# Patient Record
Sex: FEMALE | Race: WHITE | HISPANIC OR LATINO | Employment: UNEMPLOYED | URBAN - METROPOLITAN AREA
[De-identification: names, ages, dates, MRNs, and addresses within clinical notes are randomized per-mention and may not be internally consistent; named-entity substitution may affect disease eponyms.]

---
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
## 2020-07-06 ENCOUNTER — HOSPITAL ENCOUNTER (EMERGENCY)
Facility: HOSPITAL | Age: 20
Discharge: HOME | End: 2020-07-06
Attending: EMERGENCY MEDICINE | Admitting: EMERGENCY MEDICINE
Payer: COMMERCIAL

## 2020-07-06 VITALS
SYSTOLIC BLOOD PRESSURE: 118 MMHG | TEMPERATURE: 97.6 F | DIASTOLIC BLOOD PRESSURE: 77 MMHG | HEART RATE: 87 BPM | RESPIRATION RATE: 18 BRPM | OXYGEN SATURATION: 100 %

## 2020-07-06 DIAGNOSIS — S30.814A VAGINAL ABRASION, INITIAL ENCOUNTER: Primary | ICD-10-CM

## 2020-07-06 LAB
BACTERIA UR QL AUTO: ABNORMAL /HPF
BILIRUB UR QL STRIP: NEGATIVE
CLARITY UR: CLEAR
COLOR UR: ABNORMAL
EXT PREG TEST URINE: NEGATIVE
EXT. CONTROL ED NAV: NORMAL
GLUCOSE UR STRIP-MCNC: NEGATIVE MG/DL
HGB UR QL STRIP.AUTO: ABNORMAL
KETONES UR STRIP-MCNC: NEGATIVE MG/DL
LEUKOCYTE ESTERASE UR QL STRIP: NEGATIVE
NITRITE UR QL STRIP: NEGATIVE
NON-SQ EPI CELLS URNS QL MICRO: ABNORMAL /HPF
PH UR STRIP.AUTO: 7 [PH]
PROT UR STRIP-MCNC: NEGATIVE MG/DL
RBC #/AREA URNS AUTO: ABNORMAL /HPF
SP GR UR STRIP.AUTO: 1.01 (ref 1–1.03)
UROBILINOGEN UR QL STRIP.AUTO: 0.2 E.U./DL
WBC #/AREA URNS AUTO: ABNORMAL /HPF

## 2020-07-06 PROCEDURE — 99283 EMERGENCY DEPT VISIT LOW MDM: CPT

## 2020-07-06 PROCEDURE — 81025 URINE PREGNANCY TEST: CPT | Performed by: PHYSICIAN ASSISTANT

## 2020-07-06 PROCEDURE — 99282 EMERGENCY DEPT VISIT SF MDM: CPT | Performed by: PHYSICIAN ASSISTANT

## 2020-07-06 PROCEDURE — 81001 URINALYSIS AUTO W/SCOPE: CPT | Performed by: PHYSICIAN ASSISTANT

## 2020-07-06 PROCEDURE — 81003 URINALYSIS AUTO W/O SCOPE: CPT | Performed by: PHYSICIAN ASSISTANT

## 2020-07-06 NOTE — ED NOTES
Patient c/o vaginal tears that are painful, occur s/p sexual intercourse  Patient reports "no matter how slow or how much lubrication " Patient states she is hesitant to try different lubrications due to allergies        Aurora Myers RN  07/06/20 1532

## 2020-07-06 NOTE — DISCHARGE INSTRUCTIONS
Use Tylenol 650mg every 4 hours or Motrin 600mg every 6 hours; you can alternate the 2 medications taking something every 3 hours for pain or fever  If no improvement follow-up with your doctor in next few days  You can also apply over-the-counter antibiotic ointment PLUS pain to help with symptoms to outer skin areas

## 2020-07-06 NOTE — ED NOTES
JON Antoine aware of NEG urine preg  Urine sent to lab as per order  Patient aware pending results        Ricki Valdez RN  07/06/20 9371

## 2020-07-06 NOTE — ED PROVIDER NOTES
History  Chief Complaint   Patient presents with    Vaginal Pain     Patient reports "I have a couple tears down there " Vaginal pain with tears, patient reports happens with sexual intercourse  Denies dysuria  Patient reports she has not seen her OBGYN because they are too far away and it is the only provider in her network  Denies chance of pregnancy  "I literally just got my period this morning":     PT with Past Medical History Anxiety, Asthma, Borderline personality disorder, Ovarian cyst, PTSD, Schizophrenia, presents to emergency department complaining of few day history of tears to vaginal area after intercourse with some dysuria only when urine hits outside of her vagina, and starting her period today  Patient denies fever, no nausea, no vomiting, no other urinary symptoms, no bowel changes            Prior to Admission Medications   Prescriptions Last Dose Informant Patient Reported? Taking? PRAZOSIN HCL PO   Yes No   Sig: Take by mouth as needed   albuterol (PROVENTIL HFA,VENTOLIN HFA) 90 mcg/act inhaler   Yes No   Sig: Inhale 2 puffs every 6 (six) hours as needed for wheezing   loratadine (CLARITIN) 10 mg tablet   Yes No   Sig: Take 10 mg by mouth daily      Facility-Administered Medications: None       Past Medical History:   Diagnosis Date    Anxiety     Asthma     Borderline personality disorder ([facility] )     Eating disorder     Ovarian cyst     PTSD (post-traumatic stress disorder)     Schizophrenia ([facility] )        No past surgical history on file  Family History   Problem Relation Age of Onset    Asthma Mother     OCD Mother     Autism Son      I have reviewed and agree with the history as documented  E-Cigarette/Vaping     E-Cigarette/Vaping Substances     Social History     Tobacco Use    Smoking status: Never Smoker   Substance Use Topics    Alcohol use: No    Drug use: No       Review of Systems   Constitutional: Negative for fever     HENT: Negative for hearing loss and sore throat  Eyes: Negative for visual disturbance  Respiratory: Negative for cough and shortness of breath  Cardiovascular: Negative for chest pain and leg swelling  Gastrointestinal: Negative for abdominal pain and vomiting  Genitourinary: Positive for dyspareunia, dysuria, genital sores and vaginal bleeding (States she just started her menses today)  Negative for frequency, menstrual problem and pelvic pain  Musculoskeletal: Negative for arthralgias and myalgias  Skin: Negative for color change and pallor  Neurological: Negative for dizziness and weakness  Psychiatric/Behavioral: Negative for behavioral problems  Physical Exam  Physical Exam   Constitutional: She is oriented to person, place, and time  She appears well-developed and well-nourished  HENT:   Head: Normocephalic and atraumatic  Eyes: Conjunctivae are normal    Neck: Normal range of motion  Cardiovascular: Normal rate and regular rhythm  Pulmonary/Chest: Effort normal and breath sounds normal    Abdominal: Soft  Bowel sounds are normal    Genitourinary:         Genitourinary Comments: Patient with mild amount of normal dark red vaginal bleeding from menses, no other dc, no yeast seen  Two skin tears noted to lower, outer vaginal canal/intertrigo area, no bleeding, mild tenderness ,no other lesions noted   Musculoskeletal: Normal range of motion  Neurological: She is alert and oriented to person, place, and time  Skin: Skin is warm and dry  Psychiatric: She has a normal mood and affect  Her behavior is normal    Nursing note and vitals reviewed        Vital Signs  ED Triage Vitals [07/06/20 1112]   Temperature Pulse Respirations Blood Pressure SpO2   97 6 °F (36 4 °C) 87 18 118/77 100 %      Temp Source Heart Rate Source Patient Position - Orthostatic VS BP Location FiO2 (%)   Tympanic -- -- Left arm --      Pain Score       --           Vitals:    07/06/20 1112   BP: 118/77   Pulse: 87         Visual Acuity      ED Medications  Medications - No data to display    Diagnostic Studies  Results Reviewed     None                 No orders to display              Procedures  Procedures         ED Course                                             MDM  Number of Diagnoses or Management Options  Diagnosis management comments: Urine is normal no signs of other vaginal discharge, patient comfortable discussed with patient care for vaginal skin tears has good follow-up        Disposition  Final diagnoses:   Vaginal abrasion, initial encounter     Time reflects when diagnosis was documented in both MDM as applicable and the Disposition within this note     Time User Action Codes Description Comment    7/6/2020  1:08 PM Chriss Goodman Vaginal abrasion, initial encounter       ED Disposition     ED Disposition Condition Date/Time Comment    Discharge Stable Mon Jul 6, 2020  1:08 PM Soila Nolen discharge to home/self care  Follow-up Information     Follow up With Specialties Details Why Contact Info    Your GYN doctor   As needed           Patient's Medications   Discharge Prescriptions    No medications on file     No discharge procedures on file      PDMP Review     None          ED Provider  Electronically Signed by           Valarie Yeh PA-C  07/06/20 6056

## 2020-11-27 ENCOUNTER — HOSPITAL ENCOUNTER (EMERGENCY)
Facility: HOSPITAL | Age: 20
Discharge: HOME | End: 2020-11-27
Attending: EMERGENCY MEDICINE
Payer: COMMERCIAL

## 2020-11-27 VITALS
TEMPERATURE: 98.2 F | HEIGHT: 60 IN | OXYGEN SATURATION: 100 % | BODY MASS INDEX: 27.88 KG/M2 | RESPIRATION RATE: 19 BRPM | DIASTOLIC BLOOD PRESSURE: 69 MMHG | WEIGHT: 142 LBS | HEART RATE: 109 BPM | SYSTOLIC BLOOD PRESSURE: 117 MMHG

## 2020-11-27 DIAGNOSIS — T50.901A ACCIDENTAL OVERDOSE: Primary | ICD-10-CM

## 2020-11-27 LAB
ALBUMIN SERPL BCP-MCNC: 5 G/DL (ref 3.4–4.8)
ALP SERPL-CCNC: 93.4 U/L (ref 35–140)
ALT SERPL W P-5'-P-CCNC: 9 U/L (ref 5–54)
AMMONIA PLAS-SCNC: 59.5 UMOL/L
AMPHETAMINES SERPL QL SCN: NEGATIVE
ANION GAP SERPL CALCULATED.3IONS-SCNC: 6 MMOL/L (ref 4–13)
APAP SERPL-MCNC: <10 UG/ML (ref 10–20)
APTT PPP: 31 SECONDS (ref 23–31)
AST SERPL W P-5'-P-CCNC: 18 U/L (ref 15–41)
BARBITURATES UR QL: NEGATIVE
BASOPHILS # BLD AUTO: 0.02 THOUSANDS/ΜL (ref 0–0.1)
BASOPHILS NFR BLD AUTO: 0 % (ref 0–1)
BENZODIAZ UR QL: NEGATIVE
BILIRUB DIRECT SERPL-MCNC: 0.2 MG/DL (ref 0–0.3)
BILIRUB SERPL-MCNC: 1.06 MG/DL (ref 0.3–1.2)
BILIRUB UR QL STRIP: NEGATIVE
BUN SERPL-MCNC: 9 MG/DL (ref 6–20)
CALCIUM SERPL-MCNC: 9.9 MG/DL (ref 8.4–10.2)
CHLORIDE SERPL-SCNC: 100 MMOL/L (ref 96–108)
CLARITY UR: ABNORMAL
CO2 SERPL-SCNC: 27 MMOL/L (ref 22–33)
COCAINE UR QL: NEGATIVE
COLOR UR: YELLOW
CREAT SERPL-MCNC: 0.75 MG/DL (ref 0.4–1.1)
EOSINOPHIL # BLD AUTO: 0.1 THOUSAND/ΜL (ref 0–0.61)
EOSINOPHIL NFR BLD AUTO: 1 % (ref 0–6)
ERYTHROCYTE [DISTWIDTH] IN BLOOD BY AUTOMATED COUNT: 12.4 % (ref 11.6–15.1)
EXT PREG TEST URINE: NEGATIVE
EXT. CONTROL ED NAV: NORMAL
GFR SERPL CREATININE-BSD FRML MDRD: 115 ML/MIN/1.73SQ M
GLUCOSE SERPL-MCNC: 99 MG/DL (ref 65–140)
GLUCOSE UR STRIP-MCNC: NEGATIVE MG/DL
HCT VFR BLD AUTO: 38.2 % (ref 34.8–46.1)
HGB BLD-MCNC: 13 G/DL (ref 11.5–15.4)
HGB UR QL STRIP.AUTO: NEGATIVE
IMM GRANULOCYTES # BLD AUTO: 0.02 THOUSAND/UL (ref 0–0.2)
IMM GRANULOCYTES NFR BLD AUTO: 0 % (ref 0–2)
INR PPP: 1.04 (ref 0.9–1.1)
KETONES UR STRIP-MCNC: ABNORMAL MG/DL
LEUKOCYTE ESTERASE UR QL STRIP: NEGATIVE
LIPASE SERPL-CCNC: 9 U/L (ref 13–60)
LYMPHOCYTES # BLD AUTO: 2.22 THOUSANDS/ΜL (ref 0.6–4.47)
LYMPHOCYTES NFR BLD AUTO: 27 % (ref 14–44)
MAGNESIUM SERPL-MCNC: 2 MG/DL (ref 1.6–2.6)
MCH RBC QN AUTO: 30.7 PG (ref 26.8–34.3)
MCHC RBC AUTO-ENTMCNC: 34 G/DL (ref 31.4–37.4)
MCV RBC AUTO: 90 FL (ref 82–98)
METHADONE UR QL: NEGATIVE
MONOCYTES # BLD AUTO: 0.74 THOUSAND/ΜL (ref 0.17–1.22)
MONOCYTES NFR BLD AUTO: 9 % (ref 4–12)
NEUTROPHILS # BLD AUTO: 5.15 THOUSANDS/ΜL (ref 1.85–7.62)
NEUTS SEG NFR BLD AUTO: 63 % (ref 43–75)
NITRITE UR QL STRIP: NEGATIVE
OPIATES UR QL SCN: NEGATIVE
OXYCODONE+OXYMORPHONE UR QL SCN: NEGATIVE
PCP UR QL: NEGATIVE
PH UR STRIP.AUTO: 6 [PH]
PLATELET # BLD AUTO: 298 THOUSANDS/UL (ref 149–390)
PMV BLD AUTO: 10.4 FL (ref 8.9–12.7)
POTASSIUM SERPL-SCNC: 3.8 MMOL/L (ref 3.5–5)
PROT SERPL-MCNC: 8.3 G/DL (ref 6.4–8.3)
PROT UR STRIP-MCNC: NEGATIVE MG/DL
PROTHROMBIN TIME: 11.7 SECONDS (ref 9.5–12.1)
RBC # BLD AUTO: 4.23 MILLION/UL (ref 3.81–5.12)
SALICYLATES SERPL-MCNC: <2.5 MG/DL (ref 6–30)
SODIUM SERPL-SCNC: 133 MMOL/L (ref 133–145)
SP GR UR STRIP.AUTO: 1.01 (ref 1–1.03)
THC UR QL: POSITIVE
TROPONIN I SERPL-MCNC: <0.03 NG/ML (ref 0–0.07)
UROBILINOGEN UR QL STRIP.AUTO: 0.2 E.U./DL
WBC # BLD AUTO: 8.25 THOUSAND/UL (ref 4.31–10.16)

## 2020-11-27 PROCEDURE — 82140 ASSAY OF AMMONIA: CPT | Performed by: EMERGENCY MEDICINE

## 2020-11-27 PROCEDURE — 99285 EMERGENCY DEPT VISIT HI MDM: CPT

## 2020-11-27 PROCEDURE — 36415 COLL VENOUS BLD VENIPUNCTURE: CPT | Performed by: EMERGENCY MEDICINE

## 2020-11-27 PROCEDURE — 93005 ELECTROCARDIOGRAM TRACING: CPT

## 2020-11-27 PROCEDURE — 83735 ASSAY OF MAGNESIUM: CPT | Performed by: EMERGENCY MEDICINE

## 2020-11-27 PROCEDURE — 83690 ASSAY OF LIPASE: CPT | Performed by: EMERGENCY MEDICINE

## 2020-11-27 PROCEDURE — 99448 NTRPROF PH1/NTRNET/EHR 21-30: CPT | Performed by: EMERGENCY MEDICINE

## 2020-11-27 PROCEDURE — 81003 URINALYSIS AUTO W/O SCOPE: CPT | Performed by: EMERGENCY MEDICINE

## 2020-11-27 PROCEDURE — 99285 EMERGENCY DEPT VISIT HI MDM: CPT | Performed by: EMERGENCY MEDICINE

## 2020-11-27 PROCEDURE — 81025 URINE PREGNANCY TEST: CPT | Performed by: EMERGENCY MEDICINE

## 2020-11-27 PROCEDURE — 85610 PROTHROMBIN TIME: CPT | Performed by: EMERGENCY MEDICINE

## 2020-11-27 PROCEDURE — 80048 BASIC METABOLIC PNL TOTAL CA: CPT | Performed by: EMERGENCY MEDICINE

## 2020-11-27 PROCEDURE — 85730 THROMBOPLASTIN TIME PARTIAL: CPT | Performed by: EMERGENCY MEDICINE

## 2020-11-27 PROCEDURE — 80076 HEPATIC FUNCTION PANEL: CPT | Performed by: EMERGENCY MEDICINE

## 2020-11-27 PROCEDURE — 85025 COMPLETE CBC W/AUTO DIFF WBC: CPT | Performed by: EMERGENCY MEDICINE

## 2020-11-27 PROCEDURE — 87591 N.GONORRHOEAE DNA AMP PROB: CPT | Performed by: EMERGENCY MEDICINE

## 2020-11-27 PROCEDURE — 84484 ASSAY OF TROPONIN QUANT: CPT | Performed by: EMERGENCY MEDICINE

## 2020-11-27 PROCEDURE — 80329 ANALGESICS NON-OPIOID 1 OR 2: CPT | Performed by: EMERGENCY MEDICINE

## 2020-11-27 PROCEDURE — 96360 HYDRATION IV INFUSION INIT: CPT

## 2020-11-27 PROCEDURE — 80307 DRUG TEST PRSMV CHEM ANLYZR: CPT | Performed by: EMERGENCY MEDICINE

## 2020-11-27 PROCEDURE — 87491 CHLMYD TRACH DNA AMP PROBE: CPT | Performed by: EMERGENCY MEDICINE

## 2020-11-27 RX ORDER — BUPROPION HYDROCHLORIDE 75 MG/1
150 TABLET ORAL DAILY
COMMUNITY

## 2020-11-27 RX ADMIN — SODIUM CHLORIDE 1000 ML: 0.9 INJECTION, SOLUTION INTRAVENOUS at 15:02

## 2020-11-29 LAB
ATRIAL RATE: 63 BPM
C TRACH DNA SPEC QL NAA+PROBE: NEGATIVE
N GONORRHOEA DNA SPEC QL NAA+PROBE: NEGATIVE
P AXIS: 80 DEGREES
PR INTERVAL: 140 MS
QRS AXIS: 77 DEGREES
QRSD INTERVAL: 78 MS
QT INTERVAL: 392 MS
QTC INTERVAL: 417 MS
T WAVE AXIS: 42 DEGREES
VENTRICULAR RATE: 68 BPM

## 2020-11-29 PROCEDURE — 93010 ELECTROCARDIOGRAM REPORT: CPT | Performed by: INTERNAL MEDICINE

## 2020-12-05 ENCOUNTER — HOSPITAL ENCOUNTER (EMERGENCY)
Facility: HOSPITAL | Age: 20
Discharge: HOME | End: 2020-12-06
Attending: EMERGENCY MEDICINE
Payer: COMMERCIAL

## 2020-12-05 DIAGNOSIS — F20.9 SCHIZOPHRENIA (HCC): Primary | ICD-10-CM

## 2020-12-05 LAB — ETHANOL EXG-MCNC: 0 MG/DL

## 2020-12-05 PROCEDURE — 82075 ASSAY OF BREATH ETHANOL: CPT | Performed by: EMERGENCY MEDICINE

## 2020-12-05 PROCEDURE — 81025 URINE PREGNANCY TEST: CPT | Performed by: EMERGENCY MEDICINE

## 2020-12-05 PROCEDURE — 99285 EMERGENCY DEPT VISIT HI MDM: CPT | Performed by: EMERGENCY MEDICINE

## 2020-12-05 PROCEDURE — 99284 EMERGENCY DEPT VISIT MOD MDM: CPT

## 2020-12-05 RX ORDER — OLANZAPINE 10 MG/1
10 TABLET ORAL
COMMUNITY

## 2020-12-05 RX ORDER — LORAZEPAM 1 MG/1
2 TABLET ORAL ONCE
Status: COMPLETED | OUTPATIENT
Start: 2020-12-05 | End: 2020-12-06

## 2020-12-05 RX ORDER — ZIPRASIDONE MESYLATE 20 MG/ML
20 INJECTION, POWDER, LYOPHILIZED, FOR SOLUTION INTRAMUSCULAR ONCE
Status: COMPLETED | OUTPATIENT
Start: 2020-12-05 | End: 2020-12-06

## 2020-12-05 RX ORDER — HYDROXYZINE HYDROCHLORIDE 25 MG/1
25 TABLET, FILM COATED ORAL ONCE
Status: COMPLETED | OUTPATIENT
Start: 2020-12-05 | End: 2020-12-06

## 2020-12-06 VITALS
OXYGEN SATURATION: 99 % | DIASTOLIC BLOOD PRESSURE: 64 MMHG | RESPIRATION RATE: 17 BRPM | SYSTOLIC BLOOD PRESSURE: 118 MMHG | HEART RATE: 81 BPM | TEMPERATURE: 98.3 F

## 2020-12-06 LAB
AMPHETAMINES SERPL QL SCN: NEGATIVE
BARBITURATES UR QL: NEGATIVE
BENZODIAZ UR QL: NEGATIVE
COCAINE UR QL: NEGATIVE
EXT PREG TEST URINE: NEGATIVE
EXT. CONTROL ED NAV: NORMAL
FLUAV RNA RESP QL NAA+PROBE: NEGATIVE
FLUBV RNA RESP QL NAA+PROBE: NEGATIVE
METHADONE UR QL: NEGATIVE
OPIATES UR QL SCN: NEGATIVE
OXYCODONE+OXYMORPHONE UR QL SCN: NEGATIVE
PCP UR QL: NEGATIVE
RSV RNA RESP QL NAA+PROBE: NEGATIVE
SARS-COV-2 RNA RESP QL NAA+PROBE: NEGATIVE
THC UR QL: POSITIVE

## 2020-12-06 PROCEDURE — 0241U HB NFCT DS VIR RESP RNA 4 TRGT: CPT | Performed by: EMERGENCY MEDICINE

## 2020-12-06 PROCEDURE — 80307 DRUG TEST PRSMV CHEM ANLYZR: CPT | Performed by: EMERGENCY MEDICINE

## 2020-12-06 PROCEDURE — 96372 THER/PROPH/DIAG INJ SC/IM: CPT

## 2020-12-06 RX ADMIN — LORAZEPAM 2 MG: 1 TABLET ORAL at 02:52

## 2020-12-06 RX ADMIN — HYDROXYZINE HYDROCHLORIDE 25 MG: 25 TABLET ORAL at 00:30

## 2020-12-06 RX ADMIN — WATER 1.2 ML: 1 INJECTION INTRAMUSCULAR; INTRAVENOUS; SUBCUTANEOUS at 00:30

## 2020-12-06 RX ADMIN — ZIPRASIDONE MESYLATE 20 MG: 20 INJECTION, POWDER, LYOPHILIZED, FOR SOLUTION INTRAMUSCULAR at 00:30

## 2021-10-20 ENCOUNTER — HOSPITAL ENCOUNTER (EMERGENCY)
Facility: HOSPITAL | Age: 21
Discharge: HOME | End: 2021-10-20
Payer: COMMERCIAL

## 2021-10-20 VITALS
SYSTOLIC BLOOD PRESSURE: 97 MMHG | DIASTOLIC BLOOD PRESSURE: 66 MMHG | TEMPERATURE: 99 F | OXYGEN SATURATION: 100 % | RESPIRATION RATE: 18 BRPM | HEART RATE: 88 BPM

## 2021-10-20 DIAGNOSIS — S50.862A INSECT BITE OF LEFT FOREARM, INITIAL ENCOUNTER: Primary | ICD-10-CM
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
## 2021-10-20 DIAGNOSIS — R35.0 URINARY FREQUENCY: ICD-10-CM

## 2021-10-20 LAB
BILIRUB UR QL STRIP: NEGATIVE
CLARITY UR: CLEAR
COLOR UR: ABNORMAL
GLUCOSE UR STRIP-MCNC: NEGATIVE MG/DL
HGB UR QL STRIP.AUTO: NEGATIVE
KETONES UR STRIP-MCNC: NEGATIVE MG/DL
LEUKOCYTE ESTERASE UR QL STRIP: NEGATIVE
NITRITE UR QL STRIP: NEGATIVE
PH UR STRIP.AUTO: 6.5 [PH]
PROT UR STRIP-MCNC: NEGATIVE MG/DL
SP GR UR STRIP.AUTO: <=1.005 (ref 1–1.03)
UROBILINOGEN UR QL STRIP.AUTO: 0.2 E.U./DL

## 2021-10-20 PROCEDURE — 81003 URINALYSIS AUTO W/O SCOPE: CPT | Performed by: PHYSICIAN ASSISTANT

## 2021-10-20 PROCEDURE — 99281 EMR DPT VST MAYX REQ PHY/QHP: CPT

## 2021-10-20 PROCEDURE — 99284 EMERGENCY DEPT VISIT MOD MDM: CPT | Performed by: PHYSICIAN ASSISTANT

## 2022-04-15 ENCOUNTER — APPOINTMENT (EMERGENCY)
Dept: CT IMAGING | Facility: HOSPITAL | Age: 22
End: 2022-04-15
Payer: MEDICARE

## 2022-04-15 ENCOUNTER — HOSPITAL ENCOUNTER (EMERGENCY)
Facility: HOSPITAL | Age: 22
Discharge: HOME | End: 2022-04-15
Attending: EMERGENCY MEDICINE | Admitting: EMERGENCY MEDICINE
Payer: MEDICARE

## 2022-04-15 VITALS
TEMPERATURE: 98.3 F | OXYGEN SATURATION: 100 % | HEIGHT: 60 IN | HEART RATE: 84 BPM | SYSTOLIC BLOOD PRESSURE: 103 MMHG | BODY MASS INDEX: 28.13 KG/M2 | RESPIRATION RATE: 16 BRPM | WEIGHT: 143.3 LBS | DIASTOLIC BLOOD PRESSURE: 58 MMHG

## 2022-04-15 DIAGNOSIS — R11.2 NAUSEA AND VOMITING, UNSPECIFIED VOMITING TYPE: ICD-10-CM

## 2022-04-15 DIAGNOSIS — R10.9 ABDOMINAL PAIN: Primary | ICD-10-CM

## 2022-04-15 DIAGNOSIS — R51.9 HEADACHE: ICD-10-CM

## 2022-04-15 LAB
ALBUMIN SERPL BCP-MCNC: 4.5 G/DL (ref 3.5–5)
ALP SERPL-CCNC: 60 U/L (ref 34–104)
ALT SERPL W P-5'-P-CCNC: 12 U/L (ref 7–52)
ANION GAP SERPL CALCULATED.3IONS-SCNC: 10 MMOL/L (ref 4–13)
AST SERPL W P-5'-P-CCNC: 19 U/L (ref 13–39)
BACTERIA UR QL AUTO: ABNORMAL /HPF
BASOPHILS # BLD AUTO: 0.02 THOUSANDS/ΜL (ref 0–0.1)
BASOPHILS NFR BLD AUTO: 0 % (ref 0–1)
BILIRUB SERPL-MCNC: 1.01 MG/DL (ref 0.2–1)
BILIRUB UR QL STRIP: NEGATIVE
BUN SERPL-MCNC: 10 MG/DL (ref 5–25)
CALCIUM SERPL-MCNC: 9.3 MG/DL (ref 8.4–10.2)
CHLORIDE SERPL-SCNC: 100 MMOL/L (ref 96–108)
CLARITY UR: CLEAR
CO2 SERPL-SCNC: 26 MMOL/L (ref 21–32)
COLOR UR: YELLOW
CREAT SERPL-MCNC: 0.67 MG/DL (ref 0.6–1.3)
EOSINOPHIL # BLD AUTO: 0.15 THOUSAND/ΜL (ref 0–0.61)
EOSINOPHIL NFR BLD AUTO: 1 % (ref 0–6)
ERYTHROCYTE [DISTWIDTH] IN BLOOD BY AUTOMATED COUNT: 12.1 % (ref 11.6–15.1)
EXT PREG TEST URINE: NEGATIVE
EXT. CONTROL ED NAV: NORMAL
FLUAV RNA RESP QL NAA+PROBE: NEGATIVE
FLUBV RNA RESP QL NAA+PROBE: NEGATIVE
GFR SERPL CREATININE-BSD FRML MDRD: 126 ML/MIN/1.73SQ M
GLUCOSE SERPL-MCNC: 102 MG/DL (ref 65–140)
GLUCOSE UR STRIP-MCNC: NEGATIVE MG/DL
HCG SERPL QL: NEGATIVE
HCT VFR BLD AUTO: 40.9 % (ref 34.8–46.1)
HGB BLD-MCNC: 13.8 G/DL (ref 11.5–15.4)
HGB UR QL STRIP.AUTO: ABNORMAL
IMM GRANULOCYTES # BLD AUTO: 0.02 THOUSAND/UL (ref 0–0.2)
IMM GRANULOCYTES NFR BLD AUTO: 0 % (ref 0–2)
KETONES UR STRIP-MCNC: ABNORMAL MG/DL
LEUKOCYTE ESTERASE UR QL STRIP: NEGATIVE
LIPASE SERPL-CCNC: 16 U/L (ref 11–82)
LYMPHOCYTES # BLD AUTO: 0.84 THOUSANDS/ΜL (ref 0.6–4.47)
LYMPHOCYTES NFR BLD AUTO: 8 % (ref 14–44)
MCH RBC QN AUTO: 30.7 PG (ref 26.8–34.3)
MCHC RBC AUTO-ENTMCNC: 33.7 G/DL (ref 31.4–37.4)
MCV RBC AUTO: 91 FL (ref 82–98)
MONOCYTES # BLD AUTO: 0.57 THOUSAND/ΜL (ref 0.17–1.22)
MONOCYTES NFR BLD AUTO: 6 % (ref 4–12)
MUCOUS THREADS UR QL AUTO: ABNORMAL
NEUTROPHILS # BLD AUTO: 8.79 THOUSANDS/ΜL (ref 1.85–7.62)
NEUTS SEG NFR BLD AUTO: 85 % (ref 43–75)
NITRITE UR QL STRIP: NEGATIVE
NON-SQ EPI CELLS URNS QL MICRO: ABNORMAL /HPF
NRBC BLD AUTO-RTO: 0 /100 WBCS
PH UR STRIP.AUTO: 6.5 [PH]
PLATELET # BLD AUTO: 262 THOUSANDS/UL (ref 149–390)
PMV BLD AUTO: 10.1 FL (ref 8.9–12.7)
POTASSIUM SERPL-SCNC: 4 MMOL/L (ref 3.5–5.3)
PROT SERPL-MCNC: 8.1 G/DL (ref 6.4–8.4)
PROT UR STRIP-MCNC: NEGATIVE MG/DL
RBC # BLD AUTO: 4.5 MILLION/UL (ref 3.81–5.12)
RBC #/AREA URNS AUTO: ABNORMAL /HPF
RSV RNA RESP QL NAA+PROBE: NEGATIVE
SARS-COV-2 RNA RESP QL NAA+PROBE: NEGATIVE
SODIUM SERPL-SCNC: 136 MMOL/L (ref 135–147)
SP GR UR STRIP.AUTO: 1.01 (ref 1–1.03)
UROBILINOGEN UR QL STRIP.AUTO: 0.2 E.U./DL
WBC # BLD AUTO: 10.23 THOUSAND/UL (ref 4.31–10.16)
WBC #/AREA URNS AUTO: ABNORMAL /HPF

## 2022-04-15 PROCEDURE — 0241U HB NFCT DS VIR RESP RNA 4 TRGT: CPT | Performed by: EMERGENCY MEDICINE

## 2022-04-15 PROCEDURE — 99284 EMERGENCY DEPT VISIT MOD MDM: CPT | Performed by: EMERGENCY MEDICINE

## 2022-04-15 PROCEDURE — 74177 CT ABD & PELVIS W/CONTRAST: CPT

## 2022-04-15 PROCEDURE — 96361 HYDRATE IV INFUSION ADD-ON: CPT

## 2022-04-15 PROCEDURE — 81003 URINALYSIS AUTO W/O SCOPE: CPT | Performed by: EMERGENCY MEDICINE

## 2022-04-15 PROCEDURE — 96375 TX/PRO/DX INJ NEW DRUG ADDON: CPT

## 2022-04-15 PROCEDURE — 85025 COMPLETE CBC W/AUTO DIFF WBC: CPT | Performed by: EMERGENCY MEDICINE

## 2022-04-15 PROCEDURE — 80053 COMPREHEN METABOLIC PANEL: CPT | Performed by: EMERGENCY MEDICINE

## 2022-04-15 PROCEDURE — G1004 CDSM NDSC: HCPCS

## 2022-04-15 PROCEDURE — 81001 URINALYSIS AUTO W/SCOPE: CPT | Performed by: EMERGENCY MEDICINE

## 2022-04-15 PROCEDURE — 81025 URINE PREGNANCY TEST: CPT | Performed by: EMERGENCY MEDICINE

## 2022-04-15 PROCEDURE — 84703 CHORIONIC GONADOTROPIN ASSAY: CPT | Performed by: EMERGENCY MEDICINE

## 2022-04-15 PROCEDURE — 36415 COLL VENOUS BLD VENIPUNCTURE: CPT | Performed by: EMERGENCY MEDICINE

## 2022-04-15 PROCEDURE — 96374 THER/PROPH/DIAG INJ IV PUSH: CPT

## 2022-04-15 PROCEDURE — 83690 ASSAY OF LIPASE: CPT | Performed by: EMERGENCY MEDICINE

## 2022-04-15 PROCEDURE — 99284 EMERGENCY DEPT VISIT MOD MDM: CPT

## 2022-04-15 RX ORDER — DIPHENHYDRAMINE HYDROCHLORIDE 50 MG/ML
25 INJECTION INTRAMUSCULAR; INTRAVENOUS ONCE
Status: COMPLETED | OUTPATIENT
Start: 2022-04-15 | End: 2022-04-15

## 2022-04-15 RX ORDER — FAMOTIDINE 20 MG/1
20 TABLET, FILM COATED ORAL 2 TIMES DAILY
Qty: 30 TABLET | Refills: 0 | Status: SHIPPED | OUTPATIENT
Start: 2022-04-15

## 2022-04-15 RX ORDER — METOCLOPRAMIDE HYDROCHLORIDE 5 MG/ML
10 INJECTION INTRAMUSCULAR; INTRAVENOUS ONCE
Status: COMPLETED | OUTPATIENT
Start: 2022-04-15 | End: 2022-04-15

## 2022-04-15 RX ORDER — ONDANSETRON 4 MG/1
4 TABLET, ORALLY DISINTEGRATING ORAL EVERY 6 HOURS PRN
Qty: 12 TABLET | Refills: 0 | Status: SHIPPED | OUTPATIENT
Start: 2022-04-15

## 2022-04-15 RX ADMIN — IOHEXOL 100 ML: 350 INJECTION, SOLUTION INTRAVENOUS at 21:02

## 2022-04-15 RX ADMIN — SODIUM CHLORIDE 1000 ML: 0.9 INJECTION, SOLUTION INTRAVENOUS at 18:40

## 2022-04-15 RX ADMIN — FAMOTIDINE 20 MG: 10 INJECTION, SOLUTION INTRAVENOUS at 21:05

## 2022-04-15 RX ADMIN — DIPHENHYDRAMINE HYDROCHLORIDE 25 MG: 50 INJECTION, SOLUTION INTRAMUSCULAR; INTRAVENOUS at 18:48

## 2022-04-15 RX ADMIN — METOCLOPRAMIDE HYDROCHLORIDE 10 MG: 5 INJECTION INTRAMUSCULAR; INTRAVENOUS at 18:50

## 2022-04-15 NOTE — ED PROVIDER NOTES
Pt Name: Yomaira Valentino  MRN: [id number]  [place] [address]  Age/Sex: 24 y o  female  Date of evaluation: 4/15/2022  PCP: Bandar Mcgarry DO    CHIEF COMPLAINT    Chief Complaint   Patient presents with    Vomiting     Pt presents to ED via EMS from home w/ vomiting starting today  Pt states family members have same s/s  HPI    24 y o  female presenting with nausea vomiting abdominal pain and headache  Patient states she has had headache over the past 3 days, states the headache became gradually worse over the past few days, maximal on the 2nd day,  has improved over the course the day, now currently mild common sent head, worse with lights noise and better rest   The abdominal pain is burning, severe, in the center of the abdomen, worse with eating or vomiting and better rest   She notes several episodes of nonbloody vomiting  She states that her brother has very similar symptoms  She denies fever, chest pain, shortness of breath, trauma, other symptoms  HPI      Past Medical and Surgical History    Past Medical History:   Diagnosis Date    Anxiety     Asthma     Borderline personality disorder ([facility] )     Eating disorder     Ovarian cyst     PTSD (post-traumatic stress disorder)     Schizophrenia ([facility] )        History reviewed  No pertinent surgical history  Family History   Problem Relation Age of Onset    Asthma Mother     OCD Mother     Autism Son        Social History     Tobacco Use    Smoking status: Current Some Day Smoker     Types: Cigarettes    Smokeless tobacco: Never Used   Vaping Use    Vaping Use: Never used   Substance Use Topics    Alcohol use: No    Drug use: No           Allergies    Allergies   Allergen Reactions    Fish Allergy - Food Allergy Anaphylaxis    Metronidazole GI Intolerance     Vomiting    Doxycycline Vomiting    Latex     Penicillins        Home Medications    Prior to Admission medications    Medication Sig Start Date End Date Taking? Authorizing Provider   albuterol (PROVENTIL HFA,VENTOLIN HFA) 90 mcg/act inhaler Inhale 2 puffs every 6 (six) hours as needed for wheezing    Historical Provider, MD   buPROPion (WELLBUTRIN) 75 mg tablet Take 150 mg by mouth daily     Historical Provider, MD   OLANZapine (ZyPREXA) 10 mg tablet Take 10 mg by mouth daily at bedtime    Historical Provider, MD           Review of Systems    Review of Systems   Constitutional: Negative for activity change, chills and fever  HENT: Negative for drooling and facial swelling  Eyes: Negative for pain, discharge and visual disturbance  Respiratory: Negative for apnea, cough, chest tightness, shortness of breath and wheezing  Cardiovascular: Negative for chest pain and leg swelling  Gastrointestinal: Positive for abdominal pain, nausea and vomiting  Negative for constipation and diarrhea  Genitourinary: Negative for difficulty urinating, dysuria and urgency  Musculoskeletal: Negative for arthralgias, back pain and gait problem  Skin: Negative for color change and rash  Neurological: Positive for headaches  Negative for dizziness, speech difficulty and weakness  Psychiatric/Behavioral: Negative for agitation, behavioral problems and confusion  All other systems reviewed and negative  Physical Exam      ED Triage Vitals   Temperature Pulse Respirations Blood Pressure SpO2   04/15/22 1954 04/15/22 1828 04/15/22 1828 04/15/22 1828 04/15/22 1828   98 3 °F (36 8 °C) 101 16 137/85 100 %      Temp Source Heart Rate Source Patient Position - Orthostatic VS BP Location FiO2 (%)   04/15/22 1954 04/15/22 1954 04/15/22 1954 04/15/22 1954 --   Oral Monitor Lying Right arm       Pain Score       04/15/22 1954       No Pain               Physical Exam  Vitals and nursing note reviewed  Constitutional:       General: She is in acute distress  Appearance: She is well-developed  She is not ill-appearing or toxic-appearing     HENT:      Head: Normocephalic and atraumatic  Right Ear: External ear normal       Left Ear: External ear normal    Eyes:      Conjunctiva/sclera: Conjunctivae normal       Pupils: Pupils are equal, round, and reactive to light  Cardiovascular:      Rate and Rhythm: Normal rate and regular rhythm  Heart sounds: Normal heart sounds  Pulmonary:      Effort: Pulmonary effort is normal  No respiratory distress  Breath sounds: Normal breath sounds  No wheezing or rales  Abdominal:      General: There is no distension  Palpations: Abdomen is soft  Tenderness: There is abdominal tenderness  There is no guarding or rebound  Comments: Tender to palpation the epigastric region, no rebound or guarding, negative Montalvo sign, no pain at McBurney's point   Musculoskeletal:         General: No deformity  Normal range of motion  Cervical back: Normal range of motion and neck supple  Skin:     General: Skin is warm and dry  Findings: No erythema or rash  Neurological:      Mental Status: She is alert and oriented to person, place, and time  Cranial Nerves: No cranial nerve deficit  Sensory: No sensory deficit  Motor: No weakness  Coordination: Coordination normal       Gait: Gait normal       Comments: Cranial nerves 2-12 intact, 5/5 strength in all extremities, ambulating with normal steady gait, normal speech and coordination  Psychiatric:         Behavior: Behavior normal          Thought Content:  Thought content normal          Judgment: Judgment normal               Diagnostic Results      Labs:    Results Reviewed     Procedure Component Value Units Date/Time    Urine Microscopic [[id number]]  (Abnormal) Collected: 04/15/22 2112    Lab Status: Final result Specimen: Urine, Clean Catch Updated: 04/15/22 2137     RBC, UA 1-2 /hpf      WBC, UA 1-2 /hpf      Epithelial Cells Occasional /hpf      Bacteria, UA Occasional /hpf      MUCUS THREADS Occasional    UA w Reflex to Microscopic w Reflex to Culture [[id number]]  (Abnormal) Collected: 04/15/22 2112    Lab Status: Final result Specimen: Urine, Clean Catch Updated: 04/15/22 2125     Color, UA Yellow     Clarity, UA Clear     Specific Gravity, UA 1 010     pH, UA 6 5     Leukocytes, UA Negative     Nitrite, UA Negative     Protein, UA Negative mg/dl      Glucose, UA Negative mg/dl      Ketones, UA Trace mg/dl      Urobilinogen, UA 0 2 E U /dl      Bilirubin, UA Negative     Blood, UA 2+    POCT pregnancy, urine [[id number]]  (Normal) Resulted: 04/15/22 2116    Lab Status: Final result Updated: 04/15/22 2116     EXT PREG TEST UR (Ref: Negative) Negative     Control Valid    Comprehensive metabolic panel [[id number]]  (Abnormal) Collected: 04/15/22 1839    Lab Status: Final result Specimen: Blood from Arm, Left Updated: 04/15/22 1930     Sodium 136 mmol/L      Potassium 4 0 mmol/L      Chloride 100 mmol/L      CO2 26 mmol/L      ANION GAP 10 mmol/L      BUN 10 mg/dL      Creatinine 0 67 mg/dL      Glucose 102 mg/dL      Calcium 9 3 mg/dL      AST 19 U/L      ALT 12 U/L      Alkaline Phosphatase 60 U/L      Total Protein 8 1 g/dL      Albumin 4 5 g/dL      Total Bilirubin 1 01 mg/dL      eGFR 126 ml/min/1 73sq m     Narrative:      [place] guidelines for Chronic Kidney Disease (CKD):     Stage 1 with normal or high GFR (GFR > 90 mL/min/1 73 square meters)    Stage 2 Mild CKD (GFR = 60-89 mL/min/1 73 square meters)    Stage 3A Moderate CKD (GFR = 45-59 mL/min/1 73 square meters)    Stage 3B Moderate CKD (GFR = 30-44 mL/min/1 73 square meters)    Stage 4 Severe CKD (GFR = 15-29 mL/min/1 73 square meters)    Stage 5 End Stage CKD (GFR <15 mL/min/1 73 square meters)  Note: GFR calculation is accurate only with a steady state creatinine    Lipase [[id number]]  (Normal) Collected: 04/15/22 1839    Lab Status: Final result Specimen: Blood from Arm, Left Updated: 04/15/22 1930     Lipase 16 u/L     hCG, qualitative pregnancy [[id number]]  (Normal) Collected: 04/15/22 1839    Lab Status: Final result Specimen: Blood from Arm, Left Updated: 04/15/22 1930     Preg, Serum Negative    COVID/FLU/RSV - 2 hour TAT [[id number]]  (Normal) Collected: 04/15/22 1839    Lab Status: Final result Specimen: Nares from Nose Updated: 04/15/22 1924     SARS-CoV-2 Negative     INFLUENZA A PCR Negative     INFLUENZA B PCR Negative     RSV PCR Negative    Narrative:      FOR PEDIATRIC PATIENTS - copy/paste COVID Guidelines URL to browser: [email or web address]    SARS-CoV-2 assay is a Nucleic Acid Amplification assay intended for the  qualitative detection of nucleic acid from SARS-CoV-2 in nasopharyngeal  swabs  Results are for the presumptive identification of SARS-CoV-2 RNA  Positive results are indicative of infection with SARS-CoV-2, the virus  causing COVID-19, but do not rule out bacterial infection or co-infection  with other viruses  Laboratories within the [place] and its  territories are required to report all positive results to the appropriate  public health authorities  Negative results do not preclude SARS-CoV-2  infection and should not be used as the sole basis for treatment or other  patient management decisions  Negative results must be combined with  clinical observations, patient history, and epidemiological information  This test has not been FDA cleared or approved  This test has been authorized by FDA under an Emergency Use Authorization  (EUA)  This test is only authorized for the duration of time the  declaration that circumstances exist justifying the authorization of the  emergency use of an in vitro diagnostic tests for detection of SARS-CoV-2  virus and/or diagnosis of COVID-19 infection under section 564(b)(1) of  the Act, 21 U  S C  305BHS-4(V)(5), unless the authorization is terminated  or revoked sooner   The test has been validated but independent review by FDA  and CLIA is pending  Test performed using [employer] GeneXpert: This RT-PCR assay targets N2,  a region unique to SARS-CoV-2  A conserved region in the E-gene was chosen  for pan-Sarbecovirus detection which includes SARS-CoV-2  CBC and differential [[id number]]  (Abnormal) Collected: 04/15/22 1839    Lab Status: Final result Specimen: Blood from Arm, Left Updated: 04/15/22 1848     WBC 10 23 Thousand/uL      RBC 4 50 Million/uL      Hemoglobin 13 8 g/dL      Hematocrit 40 9 %      MCV 91 fL      MCH 30 7 pg      MCHC 33 7 g/dL      RDW 12 1 %      MPV 10 1 fL      Platelets 684 Thousands/uL      nRBC 0 /100 WBCs      Neutrophils Relative 85 %      Immat GRANS % 0 %      Lymphocytes Relative 8 %      Monocytes Relative 6 %      Eosinophils Relative 1 %      Basophils Relative 0 %      Neutrophils Absolute 8 79 Thousands/µL      Immature Grans Absolute 0 02 Thousand/uL      Lymphocytes Absolute 0 84 Thousands/µL      Monocytes Absolute 0 57 Thousand/µL      Eosinophils Absolute 0 15 Thousand/µL      Basophils Absolute 0 02 Thousands/µL     Narrative: This is an appended report  These results have been appended to a previously verified report  All labs reviewed and utilized in the medical decision making process    Radiology:    CT abdomen pelvis with contrast   Final Result      No acute pathology visualized on CT of the abdomen and pelvis with IV without oral contrast       Workstation performed: [id number]             All radiology studies independently viewed by me and interpreted by the radiologist     Procedure    Procedures        ED Course of Care and Re-Assessments      Labs reassuring, headache and nausea resolved with Reglan and Benadryl  Patient have residual abdominal pain that improved substantially with famotidine    Labs overall reassuring, based on continued abdominal pain discussed CT scan, after discussion risks and benefits, patient desired same, returned reassuring as well     Medications   sodium chloride 0 9 % bolus 1,000 mL (0 mL Intravenous Stopped 4/15/22 2107)   metoclopramide (REGLAN) injection 10 mg (10 mg Intravenous Given 4/15/22 1850)   diphenhydrAMINE (BENADRYL) injection 25 mg (25 mg Intravenous Given 4/15/22 1848)   famotidine (PEPCID) injection 20 mg (20 mg Intravenous Given 4/15/22 2105)   iohexol (OMNIPAQUE) 350 MG/ML injection (SINGLE-DOSE) 100 mL (100 mL Intravenous Given 4/15/22 2102)           FINAL IMPRESSION    Final diagnoses:   Nausea and vomiting, unspecified vomiting type   Abdominal pain   Headache         DISPOSITION/PLAN    Presentation as above with headache abdominal pain nausea and vomiting  Vital signs reassuring, examination remarkable for tenderness to palpation and nonfocal neurologic exam   Very low suspicion for intracranial hemorrhage, critically increased ICP, sepsis, meningitis, encephalitis, appendicitis, cholecystitis, bacterial pneumonia, other acute life threat  Treated symptomatically with good effect, discharged strict precautions follow up primary care doctor    Time reflects when diagnosis was documented in both MDM as applicable and the Disposition within this note     Time User Action Codes Description Comment    4/15/2022 10:05 PM Beverlyn Wally Add [R11 2,  R19 7] Nausea vomiting and diarrhea     4/15/2022 10:05 PM Beverlyn Wally Remove [R11 2,  R19 7] Nausea vomiting and diarrhea     4/15/2022 10:05 PM Denia Rings T Add [R11 2] Nausea and vomiting, unspecified vomiting type     4/15/2022 10:06 PM Denia Rings T Add [R10 9] Abdominal pain     4/15/2022 10:06 PM Beverlyn Wally Add [R51 9] Headache     4/15/2022 10:06 PM Beverlyn Wally Modify [R11 2] Nausea and vomiting, unspecified vomiting type     4/15/2022 10:06 PM Beverlyn Wally Modify [R10 9] Abdominal pain       ED Disposition     ED Disposition Condition Date/Time Comment    Discharge Stable Fri Apr 15, 2022 10:05 PM Divine Salomon discharge to home/self care  Follow-up Information     Follow up With Specialties Details Why Contact Info Additional [address]  Emergency Department Emergency Medicine Go to  If symptoms worsen [address] [address]  [address] Emergency Department, [address], [address]    Marylou Pizano DO Family Medicine Call in 3 days To discuss this visit and schedule close outpatient follow-up [address] [phone]               PATIENT REFERRED TO:    ELVA Maher [address] Emergency Department  [address] [address] [phone]  Go to   If symptoms worsen    Marylou Pizano DO  [address]  [place] [address] [facility]  [phone]    Call in 3 days  To discuss this visit and schedule close outpatient follow-up      DISCHARGE MEDICATIONS:    Discharge Medication List as of 4/15/2022 10:07 PM      START taking these medications    Details   famotidine (PEPCID) 20 mg tablet Take 1 tablet (20 mg total) by mouth 2 (two) times a day, Starting Fri 4/15/2022, Print      ondansetron (ZOFRAN-ODT) 4 mg disintegrating tablet Take 1 tablet (4 mg total) by mouth every 6 (six) hours as needed for nausea or vomiting, Starting Fri 4/15/2022, Print         CONTINUE these medications which have NOT CHANGED    Details   albuterol (PROVENTIL HFA,VENTOLIN HFA) 90 mcg/act inhaler Inhale 2 puffs every 6 (six) hours as needed for wheezing, Until Discontinued, Historical Med      buPROPion (WELLBUTRIN) 75 mg tablet Take 150 mg by mouth daily , Historical Med      OLANZapine (ZyPREXA) 10 mg tablet Take 10 mg by mouth daily at bedtime, Historical Med             No discharge procedures on file  Laverne Rueda MD    Portions of the record may have been created with voice recognition software    Occasional wrong word or "sound alike" substitutions may have occurred due to the inherent limitations of voice recognition software    Please read the chart carefully and recognize, using context, where substitutions have occurred     Vale Arboleda MD  04/15/22 7175

## 2022-05-02 ENCOUNTER — HOSPITAL ENCOUNTER (EMERGENCY)
Facility: HOSPITAL | Age: 22
Discharge: HOME | End: 2022-05-02
Attending: EMERGENCY MEDICINE
Payer: MEDICARE

## 2022-05-02 ENCOUNTER — APPOINTMENT (EMERGENCY)
Dept: ULTRASOUND IMAGING | Facility: HOSPITAL | Age: 22
End: 2022-05-02
Payer: MEDICARE

## 2022-05-02 VITALS
SYSTOLIC BLOOD PRESSURE: 110 MMHG | DIASTOLIC BLOOD PRESSURE: 79 MMHG | RESPIRATION RATE: 16 BRPM | TEMPERATURE: 98.4 F | HEART RATE: 98 BPM | OXYGEN SATURATION: 100 %

## 2022-05-02 DIAGNOSIS — R10.30 LOWER ABDOMINAL PAIN: Primary | ICD-10-CM

## 2022-05-02 DIAGNOSIS — R10.2 PELVIC PAIN: ICD-10-CM

## 2022-05-02 LAB
BILIRUB UR QL STRIP: NEGATIVE
CLARITY UR: CLEAR
COLOR UR: YELLOW
EXT PREG TEST URINE: NEGATIVE
EXT. CONTROL ED NAV: NORMAL
GLUCOSE UR STRIP-MCNC: NEGATIVE MG/DL
HGB UR QL STRIP.AUTO: NEGATIVE
KETONES UR STRIP-MCNC: NEGATIVE MG/DL
LEUKOCYTE ESTERASE UR QL STRIP: NEGATIVE
NITRITE UR QL STRIP: NEGATIVE
PH UR STRIP.AUTO: 6 [PH]
PROT UR STRIP-MCNC: NEGATIVE MG/DL
SP GR UR STRIP.AUTO: 1.02 (ref 1–1.03)
UROBILINOGEN UR QL STRIP.AUTO: 0.2 E.U./DL

## 2022-05-02 PROCEDURE — 76830 TRANSVAGINAL US NON-OB: CPT

## 2022-05-02 PROCEDURE — 76856 US EXAM PELVIC COMPLETE: CPT

## 2022-05-02 PROCEDURE — 99284 EMERGENCY DEPT VISIT MOD MDM: CPT

## 2022-05-02 PROCEDURE — 81003 URINALYSIS AUTO W/O SCOPE: CPT | Performed by: PHYSICIAN ASSISTANT

## 2022-05-02 PROCEDURE — 99282 EMERGENCY DEPT VISIT SF MDM: CPT | Performed by: PHYSICIAN ASSISTANT

## 2022-05-02 PROCEDURE — 81025 URINE PREGNANCY TEST: CPT | Performed by: PHYSICIAN ASSISTANT

## 2022-05-02 NOTE — ED PROVIDER NOTES
History  Chief Complaint   Patient presents with    Abdominal Pain     Pt presents to ED from home w/ abd pain starting 6 months ago  Pt states pain starts w/ pt has the urge to void  Pt with Past Medical History: Anxiety, Asthma, Borderline personality disorder , Eating disorder, Ovarian cyst, PTSD (post-traumatic stress disorder), Schizophrenia   no PSH  Presents to ED c/o several month history of ongoing diffuse lower abdominal/pelvic pain, states she usually holds her urine, bc her work, is now having some dysuria, urgency, no frequency, no hematuria  Patient also requesting pelvic exam to check to make sure everything is okay down there  Pt has GYN, goes for routine exams, but wants another opinion  NO fever, no NV, no abnormal vag bleeding/dc   + sexually active          Prior to Admission Medications   Prescriptions Last Dose Informant Patient Reported? Taking? OLANZapine (ZyPREXA) 10 mg tablet   Yes No   Sig: Take 10 mg by mouth daily at bedtime   albuterol (PROVENTIL HFA,VENTOLIN HFA) 90 mcg/act inhaler   Yes No   Sig: Inhale 2 puffs every 6 (six) hours as needed for wheezing   buPROPion (WELLBUTRIN) 75 mg tablet   Yes No   Sig: Take 150 mg by mouth daily    famotidine (PEPCID) 20 mg tablet   No No   Sig: Take 1 tablet (20 mg total) by mouth 2 (two) times a day   ondansetron (ZOFRAN-ODT) 4 mg disintegrating tablet   No No   Sig: Take 1 tablet (4 mg total) by mouth every 6 (six) hours as needed for nausea or vomiting      Facility-Administered Medications: None       Past Medical History:   Diagnosis Date    Anxiety     Asthma     Borderline personality disorder ([facility] )     Eating disorder     Ovarian cyst     PTSD (post-traumatic stress disorder)     Schizophrenia ([facility] )        History reviewed  No pertinent surgical history      Family History   Problem Relation Age of Onset    Asthma Mother     OCD Mother     Autism Son      I have reviewed and agree with the history as documented  E-Cigarette/Vaping    E-Cigarette Use Never User      E-Cigarette/Vaping Substances     Social History     Tobacco Use    Smoking status: Current Some Day Smoker     Types: Cigarettes    Smokeless tobacco: Never Used   Vaping Use    Vaping Use: Never used   Substance Use Topics    Alcohol use: No    Drug use: No       Review of Systems   Constitutional: Negative for chills and fever  HENT: Negative for hearing loss, sore throat and trouble swallowing  Eyes: Negative for visual disturbance  Respiratory: Negative for cough and shortness of breath  Cardiovascular: Negative for chest pain and leg swelling  Gastrointestinal: Positive for abdominal pain  Negative for diarrhea, nausea and vomiting  Genitourinary: Positive for dysuria, pelvic pain and urgency  Negative for difficulty urinating, frequency, vaginal bleeding and vaginal discharge  Musculoskeletal: Negative for arthralgias and myalgias  Skin: Negative for pallor and rash  Neurological: Negative for dizziness, weakness and headaches  Hematological: Does not bruise/bleed easily  Psychiatric/Behavioral: Negative for behavioral problems  All other systems reviewed and are negative  Physical Exam  Physical Exam  Vitals and nursing note reviewed  Constitutional:       General: She is not in acute distress  Appearance: She is well-developed  She is not ill-appearing  Comments: thin   HENT:      Head: Normocephalic and atraumatic  Right Ear: External ear normal       Left Ear: External ear normal       Nose: Nose normal       Mouth/Throat:      Mouth: Mucous membranes are moist       Pharynx: Oropharynx is clear  Eyes:      Conjunctiva/sclera: Conjunctivae normal    Cardiovascular:      Rate and Rhythm: Normal rate and regular rhythm  Pulmonary:      Effort: Pulmonary effort is normal  No respiratory distress  Breath sounds: Normal breath sounds  Abdominal:      General: Abdomen is flat  Bowel sounds are normal       Palpations: Abdomen is soft  Tenderness: There is no abdominal tenderness  There is no right CVA tenderness or left CVA tenderness  Hernia: No hernia is present  Genitourinary:     Vagina: No signs of injury  Vaginal discharge (scant whitish, non -odorous dc) present  No tenderness or bleeding  Cervix: No discharge  Musculoskeletal:         General: Normal range of motion  Cervical back: Normal range of motion  Skin:     General: Skin is warm and dry  Findings: No rash  Neurological:      Mental Status: She is alert and oriented to person, place, and time     Psychiatric:         Behavior: Behavior normal          Vital Signs  ED Triage Vitals [05/02/22 1013]   Temperature Pulse Respirations Blood Pressure SpO2   98 4 °F (36 9 °C) 98 16 110/79 100 %      Temp Source Heart Rate Source Patient Position - Orthostatic VS BP Location FiO2 (%)   Oral -- -- -- --      Pain Score       --           Vitals:    05/02/22 1013   BP: 110/79   Pulse: 98         Visual Acuity      ED Medications  Medications - No data to display    Diagnostic Studies  Results Reviewed     Procedure Component Value Units Date/Time    UA w Reflex to Microscopic w Reflex to Culture [[id number]]  (Normal) Collected: 05/02/22 1125    Lab Status: Final result Specimen: Urine, Clean Catch Updated: 05/02/22 1134     Color, UA Yellow     Clarity, UA Clear     Specific Gravity, UA 1 025     pH, UA 6 0     Leukocytes, UA Negative     Nitrite, UA Negative     Protein, UA Negative mg/dl      Glucose, UA Negative mg/dl      Ketones, UA Negative mg/dl      Urobilinogen, UA 0 2 E U /dl      Bilirubin, UA Negative     Blood, UA Negative    POCT pregnancy, urine [[id number]]  (Normal) Resulted: 05/02/22 1128    Lab Status: Final result Updated: 05/02/22 1128     EXT PREG TEST UR (Ref: Negative) negative     Control valid                 US pelvis complete w transvaginal   Final Result by Farzaneh Patel MD (05/02 1259)       Normal                       Workstation performed: [id number]                    Procedures  Procedures         ED Course                               SBIRT 22yo+      Most Recent Value   SBIRT (25 yo +)    In order to provide better care to our patients, we are screening all of our patients for alcohol and drug use  Would it be okay to ask you these screening questions? No Filed at: 05/02/2022 1128                    [facility]  Number of Diagnoses or Management Options  Diagnosis management comments: Normal pelvic exam       Amount and/or Complexity of Data Reviewed  Clinical lab tests: ordered and reviewed        Disposition  Final diagnoses:   Lower abdominal pain   Pelvic pain     Time reflects when diagnosis was documented in both MDM as applicable and the Disposition within this note     Time User Action Codes Description Comment    5/2/2022  1:09 PM Ottis Sham Add [R10 30] Lower abdominal pain     5/2/2022  1:10 PM Ottis Sham Add [R10 2] Pelvic pain       ED Disposition     ED Disposition Condition Date/Time Comment    Discharge Stable Mon May 2, 2022  1:09 PM Divine Slaomon discharge to home/self care  Follow-up Information     Follow up With Specialties Details Why Contact Info    Corazon Downs DO Family Medicine   [address]  [place] [address] [facility]  [phone]            Patient's Medications   Discharge Prescriptions    No medications on file       No discharge procedures on file      PDMP Review     None          ED Provider  Electronically Signed by           Agustín Das PA-C  05/02/22 5381

## 2022-05-02 NOTE — ED NOTES
Pt aware urine sample is needed and will provide specimen when able         Ashley Thomas RN  05/02/22 1037

## 2022-05-07 ENCOUNTER — HOSPITAL ENCOUNTER (EMERGENCY)
Facility: HOSPITAL | Age: 22
Discharge: HOME | End: 2022-05-07
Attending: INTERNAL MEDICINE
Payer: MEDICARE

## 2022-05-07 ENCOUNTER — APPOINTMENT (EMERGENCY)
Dept: RADIOLOGY | Facility: HOSPITAL | Age: 22
End: 2022-05-07
Payer: MEDICARE

## 2022-05-07 VITALS
HEART RATE: 67 BPM | WEIGHT: 143.3 LBS | RESPIRATION RATE: 14 BRPM | TEMPERATURE: 98.4 F | DIASTOLIC BLOOD PRESSURE: 49 MMHG | HEIGHT: 60 IN | BODY MASS INDEX: 28.13 KG/M2 | SYSTOLIC BLOOD PRESSURE: 92 MMHG | OXYGEN SATURATION: 100 %

## 2022-05-07 DIAGNOSIS — R53.1 WEAKNESS: ICD-10-CM

## 2022-05-07 DIAGNOSIS — R53.83 FATIGUE: Primary | ICD-10-CM

## 2022-05-07 LAB
ALBUMIN SERPL BCP-MCNC: 4.3 G/DL (ref 3.5–5)
ALP SERPL-CCNC: 67 U/L (ref 34–104)
ALT SERPL W P-5'-P-CCNC: 8 U/L (ref 7–52)
ANION GAP SERPL CALCULATED.3IONS-SCNC: 6 MMOL/L (ref 4–13)
AST SERPL W P-5'-P-CCNC: 14 U/L (ref 13–39)
BACTERIA UR QL AUTO: ABNORMAL /HPF
BASOPHILS # BLD AUTO: 0.03 THOUSANDS/ΜL (ref 0–0.1)
BASOPHILS NFR BLD AUTO: 0 % (ref 0–1)
BILIRUB SERPL-MCNC: 0.9 MG/DL (ref 0.2–1)
BILIRUB UR QL STRIP: NEGATIVE
BUN SERPL-MCNC: 13 MG/DL (ref 5–25)
CALCIUM SERPL-MCNC: 9.3 MG/DL (ref 8.4–10.2)
CHLORIDE SERPL-SCNC: 102 MMOL/L (ref 96–108)
CLARITY UR: CLEAR
CO2 SERPL-SCNC: 28 MMOL/L (ref 21–32)
COLOR UR: YELLOW
CREAT SERPL-MCNC: 0.74 MG/DL (ref 0.6–1.3)
EOSINOPHIL # BLD AUTO: 0.23 THOUSAND/ΜL (ref 0–0.61)
EOSINOPHIL NFR BLD AUTO: 3 % (ref 0–6)
ERYTHROCYTE [DISTWIDTH] IN BLOOD BY AUTOMATED COUNT: 12.1 % (ref 11.6–15.1)
EXT PREG TEST URINE: NEGATIVE
EXT. CONTROL ED NAV: NORMAL
FLUAV RNA RESP QL NAA+PROBE: NEGATIVE
FLUBV RNA RESP QL NAA+PROBE: NEGATIVE
GFR SERPL CREATININE-BSD FRML MDRD: 116 ML/MIN/1.73SQ M
GLUCOSE SERPL-MCNC: 109 MG/DL (ref 65–140)
GLUCOSE UR STRIP-MCNC: NEGATIVE MG/DL
HCT VFR BLD AUTO: 36.9 % (ref 34.8–46.1)
HGB BLD-MCNC: 12.7 G/DL (ref 11.5–15.4)
HGB UR QL STRIP.AUTO: ABNORMAL
IMM GRANULOCYTES # BLD AUTO: 0.02 THOUSAND/UL (ref 0–0.2)
IMM GRANULOCYTES NFR BLD AUTO: 0 % (ref 0–2)
KETONES UR STRIP-MCNC: NEGATIVE MG/DL
LEUKOCYTE ESTERASE UR QL STRIP: NEGATIVE
LYMPHOCYTES # BLD AUTO: 1.87 THOUSANDS/ΜL (ref 0.6–4.47)
LYMPHOCYTES NFR BLD AUTO: 24 % (ref 14–44)
MCH RBC QN AUTO: 31.3 PG (ref 26.8–34.3)
MCHC RBC AUTO-ENTMCNC: 34.4 G/DL (ref 31.4–37.4)
MCV RBC AUTO: 91 FL (ref 82–98)
MONOCYTES # BLD AUTO: 0.44 THOUSAND/ΜL (ref 0.17–1.22)
MONOCYTES NFR BLD AUTO: 6 % (ref 4–12)
NEUTROPHILS # BLD AUTO: 5.15 THOUSANDS/ΜL (ref 1.85–7.62)
NEUTS SEG NFR BLD AUTO: 67 % (ref 43–75)
NITRITE UR QL STRIP: NEGATIVE
NON-SQ EPI CELLS URNS QL MICRO: ABNORMAL /HPF
NRBC BLD AUTO-RTO: 0 /100 WBCS
PH UR STRIP.AUTO: 7 [PH]
PLATELET # BLD AUTO: 242 THOUSANDS/UL (ref 149–390)
PMV BLD AUTO: 10.7 FL (ref 8.9–12.7)
POTASSIUM SERPL-SCNC: 3.5 MMOL/L (ref 3.5–5.3)
PROT SERPL-MCNC: 7.4 G/DL (ref 6.4–8.4)
PROT UR STRIP-MCNC: NEGATIVE MG/DL
RBC # BLD AUTO: 4.06 MILLION/UL (ref 3.81–5.12)
RBC #/AREA URNS AUTO: ABNORMAL /HPF
RSV RNA RESP QL NAA+PROBE: NEGATIVE
SARS-COV-2 RNA RESP QL NAA+PROBE: NEGATIVE
SODIUM SERPL-SCNC: 136 MMOL/L (ref 135–147)
SP GR UR STRIP.AUTO: 1.01 (ref 1–1.03)
UROBILINOGEN UR QL STRIP.AUTO: 0.2 E.U./DL
WBC # BLD AUTO: 7.74 THOUSAND/UL (ref 4.31–10.16)
WBC #/AREA URNS AUTO: ABNORMAL /HPF

## 2022-05-07 PROCEDURE — 81001 URINALYSIS AUTO W/SCOPE: CPT | Performed by: INTERNAL MEDICINE

## 2022-05-07 PROCEDURE — 96361 HYDRATE IV INFUSION ADD-ON: CPT

## 2022-05-07 PROCEDURE — 96375 TX/PRO/DX INJ NEW DRUG ADDON: CPT

## 2022-05-07 PROCEDURE — 81003 URINALYSIS AUTO W/O SCOPE: CPT | Performed by: INTERNAL MEDICINE

## 2022-05-07 PROCEDURE — 0241U HB NFCT DS VIR RESP RNA 4 TRGT: CPT | Performed by: INTERNAL MEDICINE

## 2022-05-07 PROCEDURE — 71045 X-RAY EXAM CHEST 1 VIEW: CPT

## 2022-05-07 PROCEDURE — 99284 EMERGENCY DEPT VISIT MOD MDM: CPT | Performed by: INTERNAL MEDICINE

## 2022-05-07 PROCEDURE — 96374 THER/PROPH/DIAG INJ IV PUSH: CPT

## 2022-05-07 PROCEDURE — 80053 COMPREHEN METABOLIC PANEL: CPT | Performed by: INTERNAL MEDICINE

## 2022-05-07 PROCEDURE — 99284 EMERGENCY DEPT VISIT MOD MDM: CPT

## 2022-05-07 PROCEDURE — 85025 COMPLETE CBC W/AUTO DIFF WBC: CPT | Performed by: INTERNAL MEDICINE

## 2022-05-07 PROCEDURE — 81025 URINE PREGNANCY TEST: CPT | Performed by: INTERNAL MEDICINE

## 2022-05-07 PROCEDURE — 36415 COLL VENOUS BLD VENIPUNCTURE: CPT | Performed by: INTERNAL MEDICINE

## 2022-05-07 RX ORDER — ALPRAZOLAM 0.5 MG/1
0.5 TABLET ORAL ONCE
Status: DISCONTINUED | OUTPATIENT
Start: 2022-05-07 | End: 2022-05-07 | Stop reason: HOSPADM

## 2022-05-07 RX ORDER — ONDANSETRON 2 MG/ML
INJECTION INTRAMUSCULAR; INTRAVENOUS
Status: COMPLETED
Start: 2022-05-07 | End: 2022-05-07

## 2022-05-07 RX ORDER — ONDANSETRON 2 MG/ML
4 INJECTION INTRAMUSCULAR; INTRAVENOUS ONCE
Status: COMPLETED | OUTPATIENT
Start: 2022-05-07 | End: 2022-05-07

## 2022-05-07 RX ORDER — KETOROLAC TROMETHAMINE 30 MG/ML
15 INJECTION, SOLUTION INTRAMUSCULAR; INTRAVENOUS ONCE
Status: COMPLETED | OUTPATIENT
Start: 2022-05-07 | End: 2022-05-07

## 2022-05-07 RX ADMIN — SODIUM CHLORIDE 1000 ML: 0.9 INJECTION, SOLUTION INTRAVENOUS at 15:22

## 2022-05-07 RX ADMIN — ONDANSETRON 4 MG: 2 INJECTION INTRAMUSCULAR; INTRAVENOUS at 15:48

## 2022-05-07 RX ADMIN — KETOROLAC TROMETHAMINE 15 MG: 30 INJECTION, SOLUTION INTRAMUSCULAR at 16:36

## 2022-05-07 NOTE — ED NOTES
When asked if patient had any chance of pregnancy, patient stated "I don't know, but you can test me"  Patient encouraged to attempt to provide urine sample, declined  Advised that pain medication will not be administered prior to obtain urine sample and provided call bell to notify staff when she is able to provide a urine sample, acknowledged understanding        Adrian Madison RN  05/07/22 0376

## 2022-05-07 NOTE — DISCHARGE INSTRUCTIONS
Follow up with your PMD  Labs Reviewed   UA W REFLEX TO MICROSCOPIC WITH REFLEX TO CULTURE - Abnormal       Result Value Ref Range Status    Color, UA Yellow  Yellow Final    Clarity, UA Clear  Clear Final    Specific Gravity, UA 1 015  1 001 - 1 030 Final    pH, UA 7 0  5 0, 5 5, 6 0, 6 5, 7 0, 7 5, 8 0 Final    Leukocytes, UA Negative  Negative Final    Nitrite, UA Negative  Negative Final    Protein, UA Negative  Negative, Interference- unable to analyze mg/dl Final    Glucose, UA Negative  Negative mg/dl Final    Ketones, UA Negative  Negative mg/dl Final    Urobilinogen, UA 0 2  0 2, 1 0 E U /dl E U /dl Final    Bilirubin, UA Negative  Negative Final    Blood, UA 2+ (*) Negative Final   URINE MICROSCOPIC - Abnormal    RBC, UA 4-10 (*) None Seen, 0-1, 1-2, 2-4, 0-5 /hpf Final    WBC, UA 1-2  None Seen, 0-1, 1-2, 0-5, 2-4 /hpf Final    Epithelial Cells Occasional  None Seen, Occasional /hpf Final    Bacteria, UA Occasional  None Seen, Occasional /hpf Final   COVID19, INFLUENZA A/B, RSV PCR, [facility] - Normal    SARS-CoV-2 Negative  Negative Final    INFLUENZA A PCR Negative  Negative Final    INFLUENZA B PCR Negative  Negative Final    RSV PCR Negative  Negative Final    Narrative:     FOR PEDIATRIC PATIENTS - copy/paste COVID Guidelines URL to browser: [email or web address]    SARS-CoV-2 assay is a Nucleic Acid Amplification assay intended for the  qualitative detection of nucleic acid from SARS-CoV-2 in nasopharyngeal  swabs  Results are for the presumptive identification of SARS-CoV-2 RNA  Positive results are indicative of infection with SARS-CoV-2, the virus  causing COVID-19, but do not rule out bacterial infection or co-infection  with other viruses  Laboratories within the [place] and its  territories are required to report all positive results to the appropriate  public health authorities   Negative results do not preclude SARS-CoV-2  infection and should not be used as the sole basis for treatment or other  patient management decisions  Negative results must be combined with  clinical observations, patient history, and epidemiological information  This test has not been FDA cleared or approved  This test has been authorized by FDA under an Emergency Use Authorization  (EUA)  This test is only authorized for the duration of time the  declaration that circumstances exist justifying the authorization of the  emergency use of an in vitro diagnostic tests for detection of SARS-CoV-2  virus and/or diagnosis of COVID-19 infection under section 564(b)(1) of  the Act, 21 U  S C  912UYU-5(B)(1), unless the authorization is terminated  or revoked sooner  The test has been validated but independent review by FDA  and CLIA is pending  Test performed using [employer] Gene[employer]pert: This RT-PCR assay targets N2,  a region unique to SARS-CoV-2  A conserved region in the E-gene was chosen  for pan-Sarbecovirus detection which includes SARS-CoV-2     POCT PREGNANCY, URINE - Normal    EXT PREG TEST UR (Ref: Negative) negative   Final    Control valid   Final   CBC AND DIFFERENTIAL    WBC 7 74  4 31 - 10 16 Thousand/uL Final    RBC 4 06  3 81 - 5 12 Million/uL Final    Hemoglobin 12 7  11 5 - 15 4 g/dL Final    Hematocrit 36 9  34 8 - 46 1 % Final    MCV 91  82 - 98 fL Final    MCH 31 3  26 8 - 34 3 pg Final    MCHC 34 4  31 4 - 37 4 g/dL Final    RDW 12 1  11 6 - 15 1 % Final    MPV 10 7  8 9 - 12 7 fL Final    Platelets 629  277 - 390 Thousands/uL Final    nRBC 0  /100 WBCs Final    Neutrophils Relative 67  43 - 75 % Final    Immat GRANS % 0  0 - 2 % Final    Lymphocytes Relative 24  14 - 44 % Final    Monocytes Relative 6  4 - 12 % Final    Eosinophils Relative 3  0 - 6 % Final    Basophils Relative 0  0 - 1 % Final    Neutrophils Absolute 5 15  1 85 - 7 62 Thousands/µL Final    Immature Grans Absolute 0 02  0 00 - 0 20 Thousand/uL Final    Lymphocytes Absolute 1 87  0 60 - 4 47 Thousands/µL Final    Monocytes Absolute 0 44  0 17 - 1 22 Thousand/µL Final    Eosinophils Absolute 0 23  0 00 - 0 61 Thousand/µL Final    Basophils Absolute 0 03  0 00 - 0 10 Thousands/µL Final   COMPREHENSIVE METABOLIC PANEL    Sodium 246  135 - 147 mmol/L Final    Potassium 3 5  3 5 - 5 3 mmol/L Final    Chloride 102  96 - 108 mmol/L Final    CO2 28  21 - 32 mmol/L Final    ANION GAP 6  4 - 13 mmol/L Final    BUN 13  5 - 25 mg/dL Final    Creatinine 0 74  0 60 - 1 30 mg/dL Final    Comment: Standardized to IDMS reference method    Glucose 109  65 - 140 mg/dL Final    Comment: If the patient is fasting, the ADA then defines impaired fasting glucose as > 100 mg/dL and diabetes as > or equal to 123 mg/dL  Specimen collection should occur prior to Sulfasalazine administration due to the potential for falsely depressed results  Specimen collection should occur prior to Sulfapyridine administration due to the potential for falsely elevated results  Calcium 9 3  8 4 - 10 2 mg/dL Final    AST 14  13 - 39 U/L Final    Comment: Specimen collection should occur prior to Sulfasalazine administration due to the potential for falsely depressed results  ALT 8  7 - 52 U/L Final    Comment: Specimen collection should occur prior to Sulfasalazine administration due to the potential for falsely depressed results       Alkaline Phosphatase 67  34 - 104 U/L Final    Total Protein 7 4  6 4 - 8 4 g/dL Final    Albumin 4 3  3 5 - 5 0 g/dL Final    Total Bilirubin 0 90  0 20 - 1 00 mg/dL Final    eGFR 116  ml/min/1 73sq m Final    Narrative:     [place] guidelines for Chronic Kidney Disease (CKD):     Stage 1 with normal or high GFR (GFR > 90 mL/min/1 73 square meters)    Stage 2 Mild CKD (GFR = 60-89 mL/min/1 73 square meters)    Stage 3A Moderate CKD (GFR = 45-59 mL/min/1 73 square meters)    Stage 3B Moderate CKD (GFR = 30-44 mL/min/1 73 square meters)    Stage 4 Severe CKD (GFR = 15-29 mL/min/1 73 square meters)    Stage 5 End Stage CKD (GFR <15 mL/min/1 73 square meters)  Note: GFR calculation is accurate only with a steady state creatinine

## 2022-05-07 NOTE — ED NOTES
Patient expressed concern regarding "spasming" B/L LEs, patient legs visibly trembling, lasted approximately 30 seconds, patient reports "that just keeps happening"  Provided additional warm blanket        Pro Cochran RN  05/07/22 9924

## 2022-05-07 NOTE — ED PROVIDER NOTES
History  Chief Complaint   Patient presents with    Fatigue     Pt presents to ED from home w/ complaints of fatigue and generalized pain for days  Pt states having urinary frequency  Pt states she lifted a lot of boxes a few days ago and legs now shaky  A 20y old came for having generalized body aches and weakness  Pt with Past Medical History: Anxiety, Asthma, Borderline personality disorder , Eating disorder, Ovarian cyst, PTSD (post-traumatic stress disorder), Schizophrenia   Pt denies fever, nausea vomiting, diarrhea , but she think she has UTI  Pt has h/o of fibromyalgia but take no medications for it   Pt has no cp, sob, pt  Has her period now  Pt in no distress, took her covid vaccine, and she smoke marjuana  Prior to Admission Medications   Prescriptions Last Dose Informant Patient Reported? Taking? OLANZapine (ZyPREXA) 10 mg tablet   Yes No   Sig: Take 10 mg by mouth daily at bedtime   albuterol (PROVENTIL HFA,VENTOLIN HFA) 90 mcg/act inhaler   Yes No   Sig: Inhale 2 puffs every 6 (six) hours as needed for wheezing   buPROPion (WELLBUTRIN) 75 mg tablet   Yes No   Sig: Take 150 mg by mouth daily    famotidine (PEPCID) 20 mg tablet   No No   Sig: Take 1 tablet (20 mg total) by mouth 2 (two) times a day   ondansetron (ZOFRAN-ODT) 4 mg disintegrating tablet   No No   Sig: Take 1 tablet (4 mg total) by mouth every 6 (six) hours as needed for nausea or vomiting      Facility-Administered Medications: None       Past Medical History:   Diagnosis Date    Anxiety     Asthma     Borderline personality disorder ([facility] )     Eating disorder     Ovarian cyst     PTSD (post-traumatic stress disorder)     Schizophrenia ([facility] )        History reviewed  No pertinent surgical history  Family History   Problem Relation Age of Onset    Asthma Mother     OCD Mother     Autism Son      I have reviewed and agree with the history as documented      E-Cigarette/Vaping    E-Cigarette Use Never User E-Cigarette/Vaping Substances     Social History     Tobacco Use    Smoking status: Current Some Day Smoker     Types: Cigarettes    Smokeless tobacco: Never Used   Vaping Use    Vaping Use: Never used   Substance Use Topics    Alcohol use: No    Drug use: No       Review of Systems   Constitutional: Negative for fatigue and fever  HENT: Negative for congestion, rhinorrhea, sinus pressure, sinus pain, sore throat, tinnitus and trouble swallowing  Respiratory: Negative for cough and shortness of breath  Cardiovascular: Negative for chest pain and palpitations  Gastrointestinal: Negative for abdominal pain, diarrhea, nausea and vomiting  Endocrine: Negative for polydipsia, polyphagia and polyuria  Genitourinary: Negative for decreased urine volume, difficulty urinating, dysuria, flank pain, frequency, pelvic pain and vaginal bleeding  Musculoskeletal: Negative for back pain, myalgias, neck pain and neck stiffness  Skin: Negative for color change, pallor and rash  Neurological: Negative for dizziness, light-headedness and headaches  Psychiatric/Behavioral: Negative for agitation and behavioral problems  Physical Exam  Physical Exam  Vitals and nursing note reviewed  Constitutional:       General: She is not in acute distress  Appearance: She is well-developed and normal weight  She is not ill-appearing, toxic-appearing or diaphoretic  HENT:      Head: Normocephalic and atraumatic  Right Ear: Tympanic membrane and ear canal normal       Left Ear: Tympanic membrane and ear canal normal       Nose: Nose normal  No congestion or rhinorrhea  Mouth/Throat:      Mouth: Mucous membranes are moist       Pharynx: No oropharyngeal exudate or posterior oropharyngeal erythema  Eyes:      Extraocular Movements: Extraocular movements intact  Pupils: Pupils are equal, round, and reactive to light  Neck:      Vascular: No carotid bruit     Cardiovascular:      Rate and Rhythm: Normal rate and regular rhythm  Heart sounds: Normal heart sounds  No murmur heard  No friction rub  No gallop  Pulmonary:      Effort: Pulmonary effort is normal  No respiratory distress  Breath sounds: Normal breath sounds  No wheezing, rhonchi or rales  Abdominal:      General: Bowel sounds are normal  There is no distension  Palpations: Abdomen is soft  There is no mass  Tenderness: There is no abdominal tenderness  There is no right CVA tenderness, left CVA tenderness, guarding or rebound  Hernia: No hernia is present  Musculoskeletal:         General: No swelling, tenderness, deformity or signs of injury  Normal range of motion  Cervical back: Normal range of motion and neck supple  No rigidity or tenderness  Right lower leg: No edema  Left lower leg: No edema  Lymphadenopathy:      Cervical: No cervical adenopathy  Skin:     General: Skin is warm and dry  Capillary Refill: Capillary refill takes less than 2 seconds  Coloration: Skin is not jaundiced or pale  Findings: No bruising, erythema, lesion or rash  Neurological:      Mental Status: She is alert and oriented to person, place, and time     Psychiatric:         Mood and Affect: Mood normal          Behavior: Behavior normal          Vital Signs  ED Triage Vitals   Temperature Pulse Respirations Blood Pressure SpO2   05/07/22 1458 05/07/22 1458 05/07/22 1458 05/07/22 1458 05/07/22 1458   98 4 °F (36 9 °C) 89 16 130/99 100 %      Temp Source Heart Rate Source Patient Position - Orthostatic VS BP Location FiO2 (%)   05/07/22 1458 05/07/22 1706 05/07/22 1706 05/07/22 1706 --   Oral Monitor Lying Right arm       Pain Score       05/07/22 1636       5           Vitals:    05/07/22 1458 05/07/22 1706   BP: 130/99 (!) 92/49   Pulse: 89 67   Patient Position - Orthostatic VS:  Lying         Visual Acuity      ED Medications  Medications   ketorolac (TORADOL) injection 15 mg (15 mg Intravenous Given 5/7/22 1636)   sodium chloride 0 9 % bolus 1,000 mL (0 mL Intravenous Stopped 5/7/22 1634)   ondansetron (ZOFRAN) injection 4 mg (4 mg Intravenous Given 5/7/22 1548)       Diagnostic Studies  Results Reviewed     Procedure Component Value Units Date/Time    Urine Microscopic [[id number]]  (Abnormal) Collected: 05/07/22 1632    Lab Status: Final result Specimen: Urine, Clean Catch Updated: 05/07/22 1648     RBC, UA 4-10 /hpf      WBC, UA 1-2 /hpf      Epithelial Cells Occasional /hpf      Bacteria, UA Occasional /hpf     UA w Reflex to Microscopic w Reflex to Culture [[id number]]  (Abnormal) Collected: 05/07/22 1632    Lab Status: Final result Specimen: Urine, Clean Catch Updated: 05/07/22 1638     Color, UA Yellow     Clarity, UA Clear     Specific Gravity, UA 1 015     pH, UA 7 0     Leukocytes, UA Negative     Nitrite, UA Negative     Protein, UA Negative mg/dl      Glucose, UA Negative mg/dl      Ketones, UA Negative mg/dl      Urobilinogen, UA 0 2 E U /dl      Bilirubin, UA Negative     Blood, UA 2+    POCT pregnancy, urine [[id number]]  (Normal) Resulted: 05/07/22 1635    Lab Status: Final result Updated: 05/07/22 1635     EXT PREG TEST UR (Ref: Negative) negative     Control valid    COVID/FLU/RSV [[id number]]  (Normal) Collected: 05/07/22 1522    Lab Status: Final result Specimen: Nares from Nasopharyngeal Swab Updated: 05/07/22 1604     SARS-CoV-2 Negative     INFLUENZA A PCR Negative     INFLUENZA B PCR Negative     RSV PCR Negative    Narrative:      FOR PEDIATRIC PATIENTS - copy/paste COVID Guidelines URL to browser: [email or web address]    SARS-CoV-2 assay is a Nucleic Acid Amplification assay intended for the  qualitative detection of nucleic acid from SARS-CoV-2 in nasopharyngeal  swabs  Results are for the presumptive identification of SARS-CoV-2 RNA      Positive results are indicative of infection with SARS-CoV-2, the virus  causing COVID-19, but do not rule out bacterial infection or co-infection  with other viruses  Laboratories within the [place] and its  territories are required to report all positive results to the appropriate  public health authorities  Negative results do not preclude SARS-CoV-2  infection and should not be used as the sole basis for treatment or other  patient management decisions  Negative results must be combined with  clinical observations, patient history, and epidemiological information  This test has not been FDA cleared or approved  This test has been authorized by FDA under an Emergency Use Authorization  (EUA)  This test is only authorized for the duration of time the  declaration that circumstances exist justifying the authorization of the  emergency use of an in vitro diagnostic tests for detection of SARS-CoV-2  virus and/or diagnosis of COVID-19 infection under section 564(b)(1) of  the Act, 21 U  S C  545NQI-1(H)(2), unless the authorization is terminated  or revoked sooner  The test has been validated but independent review by FDA  and CLIA is pending  Test performed using [employer] GeneXpert: This RT-PCR assay targets N2,  a region unique to SARS-CoV-2  A conserved region in the E-gene was chosen  for pan-Sarbecovirus detection which includes SARS-CoV-2      Comprehensive metabolic panel [[id number]] Collected: 05/07/22 1522    Lab Status: Final result Specimen: Blood from Arm, Left Updated: 05/07/22 1541     Sodium 136 mmol/L      Potassium 3 5 mmol/L      Chloride 102 mmol/L      CO2 28 mmol/L      ANION GAP 6 mmol/L      BUN 13 mg/dL      Creatinine 0 74 mg/dL      Glucose 109 mg/dL      Calcium 9 3 mg/dL      AST 14 U/L      ALT 8 U/L      Alkaline Phosphatase 67 U/L      Total Protein 7 4 g/dL      Albumin 4 3 g/dL      Total Bilirubin 0 90 mg/dL      eGFR 116 ml/min/1 73sq m     Narrative:      [place] guidelines for Chronic Kidney Disease (CKD):     Stage 1 with normal or high GFR (GFR > 90 mL/min/1 73 square meters)    Stage 2 Mild CKD (GFR = 60-89 mL/min/1 73 square meters)    Stage 3A Moderate CKD (GFR = 45-59 mL/min/1 73 square meters)    Stage 3B Moderate CKD (GFR = 30-44 mL/min/1 73 square meters)    Stage 4 Severe CKD (GFR = 15-29 mL/min/1 73 square meters)    Stage 5 End Stage CKD (GFR <15 mL/min/1 73 square meters)  Note: GFR calculation is accurate only with a steady state creatinine    CBC and differential [[id number]] Collected: 05/07/22 1522    Lab Status: Final result Specimen: Blood from Arm, Left Updated: 05/07/22 1526     WBC 7 74 Thousand/uL      RBC 4 06 Million/uL      Hemoglobin 12 7 g/dL      Hematocrit 36 9 %      MCV 91 fL      MCH 31 3 pg      MCHC 34 4 g/dL      RDW 12 1 %      MPV 10 7 fL      Platelets 061 Thousands/uL      nRBC 0 /100 WBCs      Neutrophils Relative 67 %      Immat GRANS % 0 %      Lymphocytes Relative 24 %      Monocytes Relative 6 %      Eosinophils Relative 3 %      Basophils Relative 0 %      Neutrophils Absolute 5 15 Thousands/µL      Immature Grans Absolute 0 02 Thousand/uL      Lymphocytes Absolute 1 87 Thousands/µL      Monocytes Absolute 0 44 Thousand/µL      Eosinophils Absolute 0 23 Thousand/µL      Basophils Absolute 0 03 Thousands/µL                  XR chest portable   Final Result by Alexandra De La Cruz MD (05/08 1056)      No acute cardiopulmonary disease  Workstation performed: [id number]                    Procedures  Procedures         ED Course                                             MDM  Number of Diagnoses or Management Options  Diagnosis management comments: THIS IS A 24YEARS OLD WITH HIS HISTORY OF asthma, fibromyalgia, schizophrenia, came for generalized body ache weakness  Patient stated that she thing that she has UTI    Physical exam on the patient came back normal   Patient was here about 5 days ago and she has pelvic sonogram which came back normal   Patient was here 3 weeks ago and she has CT abdomen pelvis came back normal   We did COVID, flu, RSV all came back normal   CXR is negative for any infiltrate  At this point we are going to discharge patient home  Patient stated that she was anxious offered to have Xanax she refused to take it  Patient follow-up with her PMD        Amount and/or Complexity of Data Reviewed  Clinical lab tests: ordered and reviewed  Tests in the radiology section of CPT®: ordered and reviewed    Risk of Complications, Morbidity, and/or Mortality  Presenting problems: moderate  Diagnostic procedures: moderate  Management options: low        Disposition  Final diagnoses:   Fatigue   Weakness     Time reflects when diagnosis was documented in both MDM as applicable and the Disposition within this note     Time User Action Codes Description Comment    5/7/2022  5:02 PM Jyoti Zeng Add [R53 83] Fatigue     5/7/2022  5:02 PM Kirti Cruz Add [R53 1] Weakness       ED Disposition     ED Disposition Condition Date/Time Comment    Discharge Stable Sat May 7, 2022  5:02 PM Divine Salomon discharge to home/self care              Follow-up Information     Follow up With Specialties Details Why Contact Alfie Downs DO Family Medicine In 1 week  [address] [facility]  [phone]            Discharge Medication List as of 5/7/2022  5:03 PM      CONTINUE these medications which have NOT CHANGED    Details   albuterol (PROVENTIL HFA,VENTOLIN HFA) 90 mcg/act inhaler Inhale 2 puffs every 6 (six) hours as needed for wheezing, Until Discontinued, Historical Med      buPROPion (WELLBUTRIN) 75 mg tablet Take 150 mg by mouth daily , Historical Med      famotidine (PEPCID) 20 mg tablet Take 1 tablet (20 mg total) by mouth 2 (two) times a day, Starting Fri 4/15/2022, Print      OLANZapine (ZyPREXA) 10 mg tablet Take 10 mg by mouth daily at bedtime, Historical Med      ondansetron (ZOFRAN-ODT) 4 mg disintegrating tablet Take 1 tablet (4 mg total) by mouth every 6 (six) hours as needed for nausea or vomiting, Starting Fri 4/15/2022, Print             No discharge procedures on file      PDMP Review     None          ED Provider  Electronically Signed by           Kolton Bruce MD  05/10/22 1141

## 2022-05-12 ENCOUNTER — HOSPITAL ENCOUNTER (EMERGENCY)
Facility: HOSPITAL | Age: 22
Discharge: HOME | End: 2022-05-12
Attending: INTERNAL MEDICINE | Admitting: INTERNAL MEDICINE
Payer: MEDICARE

## 2022-05-12 VITALS
SYSTOLIC BLOOD PRESSURE: 137 MMHG | WEIGHT: 110 LBS | OXYGEN SATURATION: 100 % | DIASTOLIC BLOOD PRESSURE: 52 MMHG | TEMPERATURE: 97.7 F | HEART RATE: 90 BPM | RESPIRATION RATE: 16 BRPM | BODY MASS INDEX: 21.6 KG/M2 | HEIGHT: 60 IN

## 2022-05-12 DIAGNOSIS — S60.561A INSECT BITE OF RIGHT HAND, INITIAL ENCOUNTER: Primary | ICD-10-CM
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
## 2022-05-12 PROCEDURE — 99282 EMERGENCY DEPT VISIT SF MDM: CPT

## 2022-05-12 PROCEDURE — 99282 EMERGENCY DEPT VISIT SF MDM: CPT | Performed by: INTERNAL MEDICINE

## 2022-05-13 NOTE — DISCHARGE INSTRUCTIONS
Follow up with dr Vanesa Briggs  Apply ice to the swollen area  Take ibuprofen or motrin x 3 to decrease discomfort and as antimelanoma    Continue your meds as you was told

## 2022-05-13 NOTE — ED PROVIDER NOTES
History  Chief Complaint   Patient presents with    Rash     Rash and swelling on R hand starting 4-5 days ago, using claritin and neosporin without improvement, states hand is starting to become painfu;     A this is a 24years old known to the ER staff as she keep coming for minor things  Patient came today because she found that the dorsum of the right hand is little  reddish and swollen  Patient stated that this could be spider bite  This is going on for like 4 days  Patient had itching before and he she took Zyrtec  Patient has no itching now  Patient has no fever  Patient has history of borderline personality disorder, and schizophrenia  Prior to Admission Medications   Prescriptions Last Dose Informant Patient Reported? Taking? OLANZapine (ZyPREXA) 10 mg tablet   Yes No   Sig: Take 10 mg by mouth daily at bedtime   albuterol (PROVENTIL HFA,VENTOLIN HFA) 90 mcg/act inhaler   Yes No   Sig: Inhale 2 puffs every 6 (six) hours as needed for wheezing   buPROPion (WELLBUTRIN) 75 mg tablet   Yes No   Sig: Take 150 mg by mouth daily    famotidine (PEPCID) 20 mg tablet   No No   Sig: Take 1 tablet (20 mg total) by mouth 2 (two) times a day   ondansetron (ZOFRAN-ODT) 4 mg disintegrating tablet   No No   Sig: Take 1 tablet (4 mg total) by mouth every 6 (six) hours as needed for nausea or vomiting      Facility-Administered Medications: None       Past Medical History:   Diagnosis Date    Anxiety     Asthma     [facility]     Eating disorder     Fibromyalgia     Ovarian cyst     PTSD (post-traumatic stress disorder)     Schizophrenia ([facility] )        History reviewed  No pertinent surgical history  Family History   Problem Relation Age of Onset    Asthma Mother     OCD Mother     Autism Son      I have reviewed and agree with the history as documented      E-Cigarette/Vaping    E-Cigarette Use Never User      E-Cigarette/Vaping Substances     Social History Tobacco Use    Smoking status: Current Some Day Smoker     Types: Cigarettes    Smokeless tobacco: Never Used   Vaping Use    Vaping Use: Never used   Substance Use Topics    Alcohol use: No    Drug use: Yes     Types: Marijuana       Review of Systems   Constitutional: Negative for fatigue and fever  Respiratory: Negative for cough and shortness of breath  Cardiovascular: Negative for chest pain and palpitations  Gastrointestinal: Negative for abdominal pain, diarrhea, nausea and vomiting  Genitourinary: Negative for difficulty urinating, dysuria, flank pain and frequency  Musculoskeletal: Negative for back pain, myalgias, neck pain and neck stiffness  Skin: Positive for rash  Negative for color change and pallor  Neurological: Negative for dizziness, light-headedness and headaches  Psychiatric/Behavioral: Negative for agitation and behavioral problems  Physical Exam  Physical Exam  Vitals and nursing note reviewed  Constitutional:       General: She is not in acute distress  Appearance: She is well-developed  She is not ill-appearing, toxic-appearing or diaphoretic  HENT:      Head: Normocephalic and atraumatic  Right Ear: Ear canal normal       Left Ear: Ear canal normal       Nose: Nose normal       Mouth/Throat:      Mouth: Mucous membranes are moist       Pharynx: No oropharyngeal exudate  Cardiovascular:      Rate and Rhythm: Normal rate and regular rhythm  Heart sounds: Normal heart sounds  No murmur heard  No friction rub  No gallop  Pulmonary:      Effort: Pulmonary effort is normal  No respiratory distress  Breath sounds: Normal breath sounds  No wheezing  Abdominal:      General: Bowel sounds are normal       Palpations: Abdomen is soft  Musculoskeletal:         General: No tenderness or deformity  Normal range of motion  Cervical back: Normal range of motion and neck supple  Skin:     General: Skin is warm and dry        Capillary Refill: Capillary refill takes less than 2 seconds  Coloration: Skin is not jaundiced or pale  Findings: Erythema present  No bruising, lesion or rash  Comments: Dorsum of the right hand slightly swollen area of redness possible from insect bite  Neurological:      Mental Status: She is alert and oriented to person, place, and time  Psychiatric:         Behavior: Behavior normal          Vital Signs  ED Triage Vitals   Temperature Pulse Respirations Blood Pressure SpO2   05/12/22 2146 05/12/22 2143 05/12/22 2143 05/12/22 2143 05/12/22 2143   97 7 °F (36 5 °C) 90 16 137/52 100 %      Temp Source Heart Rate Source Patient Position - Orthostatic VS BP Location FiO2 (%)   05/12/22 2146 -- -- -- --   Oral          Pain Score       --                  Vitals:    05/12/22 2143   BP: 137/52   Pulse: 90         Visual Acuity      ED Medications  Medications - No data to display    Diagnostic Studies  Results Reviewed     None                 No orders to display              Procedures  Procedures         ED Course                               SBIRT 22yo+    Flowsheet Row Most Recent Value   SBIRT (23 yo +)    In order to provide better care to our patients, we are screening all of our patients for alcohol and drug use  Would it be okay to ask you these screening questions? Yes Filed at: 05/12/2022 2147   Initial Alcohol Screen: US AUDIT-C     1  How often do you have a drink containing alcohol? 0 Filed at: 05/12/2022 2147   2  How many drinks containing alcohol do you have on a typical day you are drinking? 0 Filed at: 05/12/2022 2147   3b  FEMALE Any Age, or MALE 65+: How often do you have 4 or more drinks on one occassion? 0 Filed at: 05/12/2022 2147   Audit-C Score 0 Filed at: 05/12/2022 2147   IMAN: How many times in the past year have you    Used an illegal drug or used a prescription medication for non-medical reasons?  Never Filed at: 05/12/2022 2147                    MDM  Number of Diagnoses or Management Options  Diagnosis management comments: This 24years old came for having insect bite to the dorsum of right hand which is slightly swollen reddish  Patient is known to ER and she comes for minor things  Patient advised to apply an ice on it and take some Motrin or ibuprofen for it and I assured her that this could be relieved in few days  All question concerns of the patient have been fully answered    Risk of Complications, Morbidity, and/or Mortality  Presenting problems: low  Management options: low        Disposition  Final diagnoses:   Insect bite of right hand, initial encounter     Time reflects when diagnosis was documented in both MDM as applicable and the Disposition within this note     Time User Action Codes Description Comment    5/12/2022  9:55 PM Jyoti Zeng Add [P18 314W,  W57  XXXA] Insect bite of right hand, initial encounter       ED Disposition     ED Disposition   Discharge    Condition   Stable    Date/Time   Thu May 12, 2022  9:55 PM    Comment   Soila Nolen discharge to home/self care                 Follow-up Information     Follow up With Specialties Details Why Contact Info    Corazon Downs DO Family Medicine In 3 days  [address] [facility]  [phone]            Discharge Medication List as of 5/12/2022 10:00 PM      CONTINUE these medications which have NOT CHANGED    Details   albuterol (PROVENTIL HFA,VENTOLIN HFA) 90 mcg/act inhaler Inhale 2 puffs every 6 (six) hours as needed for wheezing, Until Discontinued, Historical Med      buPROPion (WELLBUTRIN) 75 mg tablet Take 150 mg by mouth daily , Historical Med      famotidine (PEPCID) 20 mg tablet Take 1 tablet (20 mg total) by mouth 2 (two) times a day, Starting Fri 4/15/2022, Print      OLANZapine (ZyPREXA) 10 mg tablet Take 10 mg by mouth daily at bedtime, Historical Med      ondansetron (ZOFRAN-ODT) 4 mg disintegrating tablet Take 1 tablet (4 mg total) by mouth every 6 (six) hours as needed for nausea or vomiting, Starting Fri 4/15/2022, Print             No discharge procedures on file      PDMP Review     None          ED Provider  Electronically Signed by           Maria L Howard MD  05/13/22 3546

## 2022-06-29 ENCOUNTER — APPOINTMENT (EMERGENCY)
Dept: CT IMAGING | Facility: HOSPITAL | Age: 22
End: 2022-06-29
Payer: MEDICARE

## 2022-06-29 ENCOUNTER — HOSPITAL ENCOUNTER (EMERGENCY)
Facility: HOSPITAL | Age: 22
Discharge: HOME | End: 2022-06-29
Attending: EMERGENCY MEDICINE | Admitting: EMERGENCY MEDICINE
Payer: MEDICARE

## 2022-06-29 VITALS
SYSTOLIC BLOOD PRESSURE: 120 MMHG | TEMPERATURE: 98 F | DIASTOLIC BLOOD PRESSURE: 86 MMHG | HEART RATE: 66 BPM | RESPIRATION RATE: 16 BRPM | OXYGEN SATURATION: 100 %

## 2022-06-29 DIAGNOSIS — S09.90XA MINOR HEAD INJURY, INITIAL ENCOUNTER: Primary | ICD-10-CM

## 2022-06-29 LAB
ALBUMIN SERPL BCP-MCNC: 4.5 G/DL (ref 3.5–5)
ALP SERPL-CCNC: 62 U/L (ref 34–104)
ALT SERPL W P-5'-P-CCNC: 6 U/L (ref 7–52)
ANION GAP SERPL CALCULATED.3IONS-SCNC: 7 MMOL/L (ref 4–13)
AST SERPL W P-5'-P-CCNC: 15 U/L (ref 13–39)
BASOPHILS # BLD AUTO: 0.03 THOUSANDS/ΜL (ref 0–0.1)
BASOPHILS NFR BLD AUTO: 1 % (ref 0–1)
BILIRUB SERPL-MCNC: 1.26 MG/DL (ref 0.2–1)
BUN SERPL-MCNC: 8 MG/DL (ref 5–25)
CALCIUM SERPL-MCNC: 9.7 MG/DL (ref 8.4–10.2)
CHLORIDE SERPL-SCNC: 104 MMOL/L (ref 96–108)
CO2 SERPL-SCNC: 28 MMOL/L (ref 21–32)
CREAT SERPL-MCNC: 0.68 MG/DL (ref 0.6–1.3)
EOSINOPHIL # BLD AUTO: 0.23 THOUSAND/ΜL (ref 0–0.61)
EOSINOPHIL NFR BLD AUTO: 4 % (ref 0–6)
ERYTHROCYTE [DISTWIDTH] IN BLOOD BY AUTOMATED COUNT: 12 % (ref 11.6–15.1)
GFR SERPL CREATININE-BSD FRML MDRD: 124 ML/MIN/1.73SQ M
GLUCOSE SERPL-MCNC: 92 MG/DL (ref 65–140)
HCT VFR BLD AUTO: 38.4 % (ref 34.8–46.1)
HGB BLD-MCNC: 13.1 G/DL (ref 11.5–15.4)
IMM GRANULOCYTES # BLD AUTO: 0.01 THOUSAND/UL (ref 0–0.2)
IMM GRANULOCYTES NFR BLD AUTO: 0 % (ref 0–2)
LYMPHOCYTES # BLD AUTO: 2.28 THOUSANDS/ΜL (ref 0.6–4.47)
LYMPHOCYTES NFR BLD AUTO: 35 % (ref 14–44)
MCH RBC QN AUTO: 30.7 PG (ref 26.8–34.3)
MCHC RBC AUTO-ENTMCNC: 34.1 G/DL (ref 31.4–37.4)
MCV RBC AUTO: 90 FL (ref 82–98)
MONOCYTES # BLD AUTO: 0.6 THOUSAND/ΜL (ref 0.17–1.22)
MONOCYTES NFR BLD AUTO: 9 % (ref 4–12)
NEUTROPHILS # BLD AUTO: 3.45 THOUSANDS/ΜL (ref 1.85–7.62)
NEUTS SEG NFR BLD AUTO: 51 % (ref 43–75)
NRBC BLD AUTO-RTO: 0 /100 WBCS
PLATELET # BLD AUTO: 287 THOUSANDS/UL (ref 149–390)
PMV BLD AUTO: 9.5 FL (ref 8.9–12.7)
POTASSIUM SERPL-SCNC: 4 MMOL/L (ref 3.5–5.3)
PROT SERPL-MCNC: 7.6 G/DL (ref 6.4–8.4)
RBC # BLD AUTO: 4.27 MILLION/UL (ref 3.81–5.12)
SODIUM SERPL-SCNC: 139 MMOL/L (ref 135–147)
WBC # BLD AUTO: 6.6 THOUSAND/UL (ref 4.31–10.16)

## 2022-06-29 PROCEDURE — 72125 CT NECK SPINE W/O DYE: CPT

## 2022-06-29 PROCEDURE — 99284 EMERGENCY DEPT VISIT MOD MDM: CPT

## 2022-06-29 PROCEDURE — 80053 COMPREHEN METABOLIC PANEL: CPT | Performed by: PHYSICIAN ASSISTANT

## 2022-06-29 PROCEDURE — 70450 CT HEAD/BRAIN W/O DYE: CPT

## 2022-06-29 PROCEDURE — 99284 EMERGENCY DEPT VISIT MOD MDM: CPT | Performed by: PHYSICIAN ASSISTANT

## 2022-06-29 PROCEDURE — 85025 COMPLETE CBC W/AUTO DIFF WBC: CPT | Performed by: PHYSICIAN ASSISTANT

## 2022-06-29 PROCEDURE — 36415 COLL VENOUS BLD VENIPUNCTURE: CPT | Performed by: PHYSICIAN ASSISTANT

## 2022-06-29 PROCEDURE — G1004 CDSM NDSC: HCPCS

## 2022-06-29 NOTE — DISCHARGE INSTRUCTIONS
Please return to the emergency department for worsening symptoms including chest pain, shortness of breath, dizziness, lightheadedness, fever greater than 103, severe pain, inability to walk, fainting episodes, etc  Please follow-up with your family practice provider as soon as possible  Please follow-up with neurology as soon as possible  I have given you a referral from here

## 2022-06-30 NOTE — ED PROVIDER NOTES
History  Chief Complaint   Patient presents with    Medical Problem     Pt states she has been having seizures, and "there is something wrong with my brain gland" Pt states she has been having headaches  This is a 44-year-old female with past medical history significant for borderline personality disorder, schizophrenia, and fibromyalgia presenting to the emergency department today for numerous medical complaints  She notes she had 6 "seizures" yesterday where she had episodes of shaking  She had a prodromal symptom of "feeling a twinge in my neck"  During her seizure, the patient retained consciousness, did not have any bowel/bladder incontinence, and did not bite her tongue  Patient struck her head during one of the episodes of "seizure-like" activity  She is uncertain how long they lasted but she notes she was able to [facility] through the entire episode  She also notes that she thinks her pineal gland is "bad"  She is concerned that she might have a pineal gland on her cyst secondary to having these "seizures"  She notes she was referred to neurology but has not gotten an MRI  Has had an EEG in the past per her which was without evidence of epilepsy  The patient denies other complaints at this time  History provided by:  Patient   used: No    Medical Problem  Severity:  Moderate  Onset quality:  Gradual  Timing:  Intermittent  Progression:  Waxing and waning  Chronicity:  Recurrent  Associated symptoms: headaches    Associated symptoms: no abdominal pain, no chest pain, no congestion, no cough, no diarrhea, no ear pain, no fatigue, no fever, no loss of consciousness, no myalgias, no nausea, no rash, no rhinorrhea, no shortness of breath, no sore throat, no vomiting and no wheezing        Prior to Admission Medications   Prescriptions Last Dose Informant Patient Reported? Taking?    OLANZapine (ZyPREXA) 10 mg tablet   Yes No   Sig: Take 10 mg by mouth daily at bedtime albuterol (PROVENTIL HFA,VENTOLIN HFA) 90 mcg/act inhaler   Yes No   Sig: Inhale 2 puffs every 6 (six) hours as needed for wheezing   buPROPion (WELLBUTRIN) 75 mg tablet   Yes No   Sig: Take 150 mg by mouth daily    famotidine (PEPCID) 20 mg tablet   No No   Sig: Take 1 tablet (20 mg total) by mouth 2 (two) times a day   ondansetron (ZOFRAN-ODT) 4 mg disintegrating tablet   No No   Sig: Take 1 tablet (4 mg total) by mouth every 6 (six) hours as needed for nausea or vomiting      Facility-Administered Medications: None       Past Medical History:   Diagnosis Date    Anxiety     Asthma     [facility] ([facility] )     Eating disorder     Fibromyalgia     Ovarian cyst     PTSD (post-traumatic stress disorder)     Schizophrenia ([place] [address] )        History reviewed  No pertinent surgical history  Family History   Problem Relation Age of Onset    Asthma Mother     OCD Mother     Autism Son      I have reviewed and agree with the history as documented  E-Cigarette/Vaping    E-Cigarette Use Never User      E-Cigarette/Vaping Substances     Social History     Tobacco Use    Smoking status: Current Some Day Smoker     Types: Cigarettes    Smokeless tobacco: Never Used   Vaping Use    Vaping Use: Never used   Substance Use Topics    Alcohol use: No    Drug use: Not Currently     Types: Marijuana       Review of Systems   Constitutional: Negative for appetite change, chills, diaphoresis, fatigue and fever  HENT: Negative for congestion, ear pain, rhinorrhea and sore throat  Eyes: Negative for visual disturbance  Respiratory: Negative for cough, chest tightness, shortness of breath and wheezing  Cardiovascular: Negative for chest pain, palpitations and leg swelling  Gastrointestinal: Negative for abdominal pain, constipation, diarrhea, nausea and vomiting  Musculoskeletal: Negative for myalgias, neck pain and neck stiffness  Skin: Negative for rash and wound     Neurological: Positive for headaches  Negative for dizziness, loss of consciousness, syncope, weakness, light-headedness and numbness  Psychiatric/Behavioral: Negative for confusion  All other systems reviewed and are negative  Physical Exam  Physical Exam  Vitals and nursing note reviewed  Constitutional:       General: She is not in acute distress  Appearance: Normal appearance  She is normal weight  She is not ill-appearing, toxic-appearing or diaphoretic  HENT:      Head: Normocephalic and atraumatic  Right Ear: Tympanic membrane, ear canal and external ear normal  There is no impacted cerumen  Left Ear: Tympanic membrane, ear canal and external ear normal  There is no impacted cerumen  Ears:      Comments: Negative hemotympanum and Herrera sign bilaterally     Nose: Nose normal  No congestion or rhinorrhea  Mouth/Throat:      Mouth: Mucous membranes are moist       Pharynx: No oropharyngeal exudate or posterior oropharyngeal erythema  Eyes:      General: No scleral icterus  Right eye: No discharge  Left eye: No discharge  Extraocular Movements: Extraocular movements intact  Pupils: Pupils are equal, round, and reactive to light  Comments: Negative raccoon eyes bilaterally   Neck:      Comments: No tenderness to palpation to cervical spine or paracervical musculature bilaterally  No step-offs or deformities  Cardiovascular:      Rate and Rhythm: Normal rate and regular rhythm  Pulses: Normal pulses  Heart sounds: Normal heart sounds  No murmur heard  No friction rub  No gallop  Pulmonary:      Effort: Pulmonary effort is normal  No respiratory distress  Breath sounds: Normal breath sounds  No stridor  No wheezing, rhonchi or rales  Chest:      Chest wall: No tenderness  Abdominal:      General: Abdomen is flat  There is no distension  Palpations: Abdomen is soft  Tenderness: There is no abdominal tenderness   There is no right CVA tenderness, left CVA tenderness, guarding or rebound  Musculoskeletal:         General: Normal range of motion  Cervical back: Normal range of motion  No tenderness  Right lower leg: No edema  Left lower leg: No edema  Skin:     General: Skin is warm and dry  Capillary Refill: Capillary refill takes less than 2 seconds  Coloration: Skin is not jaundiced or pale  Neurological:      General: No focal deficit present  Mental Status: She is alert and oriented to person, place, and time  Mental status is at baseline        Comments: 5/5 strength in bilateral upper and lower extremities  Normal sensation of bilateral upper and lower extremities  Normal finger-to-nose examination  The patient is able to smile, frown, puff out cheeks, and raise eyebrows bilaterally symmetrically without difficulty  No cerebellar signs are dysdiadochokinesia  No seizure-like activity in the emergency department   Psychiatric:         Mood and Affect: Mood normal          Behavior: Behavior normal          Vital Signs  ED Triage Vitals [06/29/22 1834]   Temperature Pulse Respirations Blood Pressure SpO2   98 °F (36 7 °C) 66 16 120/86 100 %      Temp Source Heart Rate Source Patient Position - Orthostatic VS BP Location FiO2 (%)   Oral -- Lying Right arm --      Pain Score       No Pain           Vitals:    06/29/22 1834   BP: 120/86   Pulse: 66   Patient Position - Orthostatic VS: Lying         Visual Acuity      ED Medications  Medications - No data to display    Diagnostic Studies  Results Reviewed     Procedure Component Value Units Date/Time    Comprehensive metabolic panel [[id number]]  (Abnormal) Collected: 06/29/22 1852    Lab Status: Final result Specimen: Blood from Arm, Left Updated: 06/29/22 1912     Sodium 139 mmol/L      Potassium 4 0 mmol/L      Chloride 104 mmol/L      CO2 28 mmol/L      ANION GAP 7 mmol/L      BUN 8 mg/dL      Creatinine 0 68 mg/dL      Glucose 92 mg/dL      Calcium 9 7 mg/dL      AST 15 U/L      ALT 6 U/L      Alkaline Phosphatase 62 U/L      Total Protein 7 6 g/dL      Albumin 4 5 g/dL      Total Bilirubin 1 26 mg/dL      eGFR 124 ml/min/1 73sq m     Narrative:      [place] guidelines for Chronic Kidney Disease (CKD):     Stage 1 with normal or high GFR (GFR > 90 mL/min/1 73 square meters)    Stage 2 Mild CKD (GFR = 60-89 mL/min/1 73 square meters)    Stage 3A Moderate CKD (GFR = 45-59 mL/min/1 73 square meters)    Stage 3B Moderate CKD (GFR = 30-44 mL/min/1 73 square meters)    Stage 4 Severe CKD (GFR = 15-29 mL/min/1 73 square meters)    Stage 5 End Stage CKD (GFR <15 mL/min/1 73 square meters)  Note: GFR calculation is accurate only with a steady state creatinine    CBC and differential [[id number]] Collected: 06/29/22 1852    Lab Status: Final result Specimen: Blood from Arm, Left Updated: 06/29/22 1857     WBC 6 60 Thousand/uL      RBC 4 27 Million/uL      Hemoglobin 13 1 g/dL      Hematocrit 38 4 %      MCV 90 fL      MCH 30 7 pg      MCHC 34 1 g/dL      RDW 12 0 %      MPV 9 5 fL      Platelets 703 Thousands/uL      nRBC 0 /100 WBCs      Neutrophils Relative 51 %      Immat GRANS % 0 %      Lymphocytes Relative 35 %      Monocytes Relative 9 %      Eosinophils Relative 4 %      Basophils Relative 1 %      Neutrophils Absolute 3 45 Thousands/µL      Immature Grans Absolute 0 01 Thousand/uL      Lymphocytes Absolute 2 28 Thousands/µL      Monocytes Absolute 0 60 Thousand/µL      Eosinophils Absolute 0 23 Thousand/µL      Basophils Absolute 0 03 Thousands/µL                  CT head without contrast   Final Result by Pritesh Mejia MD (06/29 1911)      No acute intracranial abnormality  Workstation performed: [id number]         CT spine cervical without contrast   Final Result by Pritesh Mejia MD (06/29 1913)      No cervical spine fracture or traumatic malalignment                     Workstation performed: [id number] Procedures  Procedures         ED Course                               SBIRT 22yo+    Flowsheet Row Most Recent Value   SBIRT (23 yo +)    In order to provide better care to our patients, we are screening all of our patients for alcohol and drug use  Would it be okay to ask you these screening questions? No Filed at: 06/29/2022 1911                    MDM  Number of Diagnoses or Management Options  Minor head injury, initial encounter: new and requires workup  Diagnosis management comments: This is a 26-year-old female presenting to the emergency department today for "seizure-like activity" and a "bad pineal gland"  Notes she had 6 seizures yesterday  She was able to [facility] through all of them and did not lose consciousness  No tongue biting or bowel/bladder incontinence  Had an EEG in the past that was nonepileptiform  Likely not seizure-like activity  Did have a minor head strike and therefore will scan  CT head and cervical spine negative for any acute pathology  CBC and CMP both within normal limits  I doubt this is seizure-like activity  The patient is stable for discharge at this time  Recommend neurology follow-up  Strict return precautions were given  Recommend PCP follow-up as soon as possible  The patient and/or patient's proxy verify their understanding and agree to the plan at this time  All questions answered to the patient and/or their proxy's satisfaction  All labs reviewed and utilized in the medical decision making process  All radiology studies independently viewed by me and interpreted by the radiologist  Portions of the record may have been created with voice recognition software   Occasional wrong word or "sound a like" substitutions may have occurred due to the inherent limitations of voice recognition software   Read the chart carefully and recognize, using context, where substitutions have occurred         Amount and/or Complexity of Data Reviewed  Clinical lab tests: ordered and reviewed  Tests in the radiology section of CPT®: ordered and reviewed  Review and summarize past medical records: yes    Patient Progress  Patient progress: stable      Disposition  Final diagnoses:   Minor head injury, initial encounter     Time reflects when diagnosis was documented in both MDM as applicable and the Disposition within this note     Time User Action Codes Description Comment    6/29/2022  7:21 PM Demetrius Jean-Baptiste Add [S09 [id number]] Minor head injury, initial encounter       ED Disposition     ED Disposition   Discharge    Condition   Stable    Date/Time   Wed Jun 29, 2022 [facility] discharge to home/self care                 Follow-up Information     Follow up With Specialties Details Why Contact Info Additional Information    Brett Corbin DO Family Medicine Schedule an appointment as soon as possible for a visit   [facility] [address]  [place] [phone]       R Vern Maher [address] Emergency Department Emergency Medicine Go to  If symptoms worsen [address] [facility],[id number]  [address] [facility] Emergency Department, [address], [address] [place] [address]    [facility] [address] [facility] Neurology Call   Haroon Segura [facility] [address] [facility], [address] [facility], [address] [place], [place], [facility]          Discharge Medication List as of 6/29/2022  7:22 PM      CONTINUE these medications which have NOT CHANGED    Details   albuterol (PROVENTIL HFA,VENTOLIN HFA) 90 mcg/act inhaler Inhale 2 puffs every 6 (six) hours as needed for wheezing, Until Discontinued, Historical Med      buPROPion (WELLBUTRIN) 75 mg tablet Take 150 mg by mouth daily , Historical Med      famotidine (PEPCID) 20 mg tablet Take 1 tablet (20 mg total) by mouth 2 (two) times a day, Starting Fri 4/15/2022, Print OLANZapine (ZyPREXA) 10 mg tablet Take 10 mg by mouth daily at bedtime, Historical Med      ondansetron (ZOFRAN-ODT) 4 mg disintegrating tablet Take 1 tablet (4 mg total) by mouth every 6 (six) hours as needed for nausea or vomiting, Starting Fri 4/15/2022, Print                 PDMP Review     None          ED Provider  Electronically Signed by           Jackie Bhakta PA-C  06/30/22 9780

## 2023-12-25 ENCOUNTER — HOSPITAL ENCOUNTER (EMERGENCY)
Facility: HOSPITAL | Age: 23
Discharge: HOME | End: 2023-12-26
Attending: EMERGENCY MEDICINE
Payer: MEDICARE

## 2023-12-25 DIAGNOSIS — K52.9 GASTROENTERITIS: ICD-10-CM

## 2023-12-25 DIAGNOSIS — R11.2 NAUSEA AND VOMITING: Primary | ICD-10-CM

## 2023-12-25 DIAGNOSIS — R11.2 NAUSEA AND VOMITING, UNSPECIFIED VOMITING TYPE: ICD-10-CM

## 2023-12-25 LAB
ALBUMIN SERPL BCP-MCNC: 4.2 G/DL (ref 3.5–5)
ALP SERPL-CCNC: 59 U/L (ref 34–104)
ALT SERPL W P-5'-P-CCNC: 8 U/L (ref 7–52)
ANION GAP SERPL CALCULATED.3IONS-SCNC: 5 MMOL/L
AST SERPL W P-5'-P-CCNC: 17 U/L (ref 13–39)
BASOPHILS # BLD AUTO: 0.02 THOUSANDS/ÂΜL (ref 0–0.1)
BASOPHILS NFR BLD AUTO: 0 % (ref 0–1)
BILIRUB SERPL-MCNC: 0.84 MG/DL (ref 0.2–1)
BUN SERPL-MCNC: 9 MG/DL (ref 5–25)
CALCIUM SERPL-MCNC: 8.5 MG/DL (ref 8.4–10.2)
CHLORIDE SERPL-SCNC: 106 MMOL/L (ref 96–108)
CO2 SERPL-SCNC: 27 MMOL/L (ref 21–32)
CREAT SERPL-MCNC: 0.6 MG/DL (ref 0.6–1.3)
EOSINOPHIL # BLD AUTO: 0.27 THOUSAND/ÂΜL (ref 0–0.61)
EOSINOPHIL NFR BLD AUTO: 2 % (ref 0–6)
ERYTHROCYTE [DISTWIDTH] IN BLOOD BY AUTOMATED COUNT: 12.2 % (ref 11.6–15.1)
GFR SERPL CREATININE-BSD FRML MDRD: 128 ML/MIN/1.73SQ M
GLUCOSE SERPL-MCNC: 104 MG/DL (ref 65–140)
HCT VFR BLD AUTO: 36.6 % (ref 34.8–46.1)
HGB BLD-MCNC: 12.3 G/DL (ref 11.5–15.4)
IMM GRANULOCYTES # BLD AUTO: 0.04 THOUSAND/UL (ref 0–0.2)
IMM GRANULOCYTES NFR BLD AUTO: 0 % (ref 0–2)
LIPASE SERPL-CCNC: 20 U/L (ref 11–82)
LYMPHOCYTES # BLD AUTO: 0.79 THOUSANDS/ÂΜL (ref 0.6–4.47)
LYMPHOCYTES NFR BLD AUTO: 7 % (ref 14–44)
MCH RBC QN AUTO: 31 PG (ref 26.8–34.3)
MCHC RBC AUTO-ENTMCNC: 33.6 G/DL (ref 31.4–37.4)
MCV RBC AUTO: 92 FL (ref 82–98)
MONOCYTES # BLD AUTO: 0.75 THOUSAND/ÂΜL (ref 0.17–1.22)
MONOCYTES NFR BLD AUTO: 7 % (ref 4–12)
NEUTROPHILS # BLD AUTO: 9.75 THOUSANDS/ÂΜL (ref 1.85–7.62)
NEUTS SEG NFR BLD AUTO: 84 % (ref 43–75)
NRBC BLD AUTO-RTO: 0 /100 WBCS
PLATELET # BLD AUTO: 247 THOUSANDS/UL (ref 149–390)
PMV BLD AUTO: 10.2 FL (ref 8.9–12.7)
POTASSIUM SERPL-SCNC: 3.7 MMOL/L (ref 3.5–5.3)
PROT SERPL-MCNC: 6.9 G/DL (ref 6.4–8.4)
RBC # BLD AUTO: 3.97 MILLION/UL (ref 3.81–5.12)
SODIUM SERPL-SCNC: 138 MMOL/L (ref 135–147)
WBC # BLD AUTO: 11.62 THOUSAND/UL (ref 4.31–10.16)

## 2023-12-25 PROCEDURE — 96361 HYDRATE IV INFUSION ADD-ON: CPT

## 2023-12-25 PROCEDURE — 36415 COLL VENOUS BLD VENIPUNCTURE: CPT | Performed by: EMERGENCY MEDICINE

## 2023-12-25 PROCEDURE — 85025 COMPLETE CBC W/AUTO DIFF WBC: CPT | Performed by: EMERGENCY MEDICINE

## 2023-12-25 PROCEDURE — 99284 EMERGENCY DEPT VISIT MOD MDM: CPT

## 2023-12-25 PROCEDURE — 80053 COMPREHEN METABOLIC PANEL: CPT | Performed by: EMERGENCY MEDICINE

## 2023-12-25 PROCEDURE — 0241U HB NFCT DS VIR RESP RNA 4 TRGT: CPT | Performed by: EMERGENCY MEDICINE

## 2023-12-25 PROCEDURE — 96374 THER/PROPH/DIAG INJ IV PUSH: CPT

## 2023-12-25 PROCEDURE — 99284 EMERGENCY DEPT VISIT MOD MDM: CPT | Performed by: EMERGENCY MEDICINE

## 2023-12-25 PROCEDURE — 83690 ASSAY OF LIPASE: CPT | Performed by: EMERGENCY MEDICINE

## 2023-12-25 PROCEDURE — 81025 URINE PREGNANCY TEST: CPT | Performed by: EMERGENCY MEDICINE

## 2023-12-25 RX ORDER — ONDANSETRON 2 MG/ML
4 INJECTION INTRAMUSCULAR; INTRAVENOUS ONCE
Status: COMPLETED | OUTPATIENT
Start: 2023-12-25 | End: 2023-12-25

## 2023-12-25 RX ADMIN — ONDANSETRON 4 MG: 2 INJECTION INTRAMUSCULAR; INTRAVENOUS at 23:22

## 2023-12-25 RX ADMIN — SODIUM CHLORIDE 1000 ML: 0.9 INJECTION, SOLUTION INTRAVENOUS at 23:22

## 2023-12-26 VITALS
DIASTOLIC BLOOD PRESSURE: 96 MMHG | OXYGEN SATURATION: 100 % | HEART RATE: 75 BPM | RESPIRATION RATE: 16 BRPM | TEMPERATURE: 98.3 F | SYSTOLIC BLOOD PRESSURE: 129 MMHG

## 2023-12-26 LAB
BILIRUB UR QL STRIP: NEGATIVE
CLARITY UR: CLEAR
COLOR UR: YELLOW
EXT PREGNANCY TEST URINE: NEGATIVE
EXT. CONTROL: NORMAL
FLUAV RNA RESP QL NAA+PROBE: NEGATIVE
FLUBV RNA RESP QL NAA+PROBE: NEGATIVE
GLUCOSE UR STRIP-MCNC: NEGATIVE MG/DL
HGB UR QL STRIP.AUTO: NEGATIVE
KETONES UR STRIP-MCNC: NEGATIVE MG/DL
LEUKOCYTE ESTERASE UR QL STRIP: NEGATIVE
NITRITE UR QL STRIP: NEGATIVE
PH UR STRIP.AUTO: 7.5 [PH]
PROT UR STRIP-MCNC: NEGATIVE MG/DL
RSV RNA RESP QL NAA+PROBE: NEGATIVE
SARS-COV-2 RNA RESP QL NAA+PROBE: NEGATIVE
SP GR UR STRIP.AUTO: 1.02 (ref 1–1.03)
UROBILINOGEN UR QL STRIP.AUTO: 0.2 E.U./DL

## 2023-12-26 PROCEDURE — 96361 HYDRATE IV INFUSION ADD-ON: CPT

## 2023-12-26 PROCEDURE — 81003 URINALYSIS AUTO W/O SCOPE: CPT | Performed by: EMERGENCY MEDICINE

## 2023-12-26 RX ORDER — ONDANSETRON 4 MG/1
4 TABLET, ORALLY DISINTEGRATING ORAL EVERY 6 HOURS PRN
Qty: 12 TABLET | Refills: 0 | Status: SHIPPED | OUTPATIENT
Start: 2023-12-26

## 2023-12-26 NOTE — ED PROVIDER NOTES
History  Chief Complaint   Patient presents with    Vomiting     Pt reports vomiting 4-5x in the past hour and a half. Pt took anti nausea pill 1 hour pta but vomited that too.      HPI    Prior to Admission Medications   Prescriptions Last Dose Informant Patient Reported? Taking?   OLANZapine (ZyPREXA) 10 mg tablet   Yes No   Sig: Take 10 mg by mouth daily at bedtime   albuterol (PROVENTIL HFA,VENTOLIN HFA) 90 mcg/act inhaler   Yes No   Sig: Inhale 2 puffs every 6 (six) hours as needed for wheezing   buPROPion (WELLBUTRIN) 75 mg tablet   Yes No   Sig: Take 150 mg by mouth daily    famotidine (PEPCID) 20 mg tablet   No No   Sig: Take 1 tablet (20 mg total) by mouth 2 (two) times a day   ondansetron (ZOFRAN-ODT) 4 mg disintegrating tablet   No No   Sig: Take 1 tablet (4 mg total) by mouth every 6 (six) hours as needed for nausea or vomiting   ondansetron (ZOFRAN-ODT) 4 mg disintegrating tablet   No Yes   Sig: Take 1 tablet (4 mg total) by mouth every 6 (six) hours as needed for nausea or vomiting      Facility-Administered Medications: None       Past Medical History:   Diagnosis Date    Anxiety     Asthma     Borderline personality disorder (HCC)     Eating disorder     Fibromyalgia     Ovarian cyst     PTSD (post-traumatic stress disorder)     Schizophrenia (HCC)        History reviewed. No pertinent surgical history.    Family History   Problem Relation Age of Onset    Asthma Mother     OCD Mother     Autism Son      I have reviewed and agree with the history as documented.    E-Cigarette/Vaping    E-Cigarette Use Never User      E-Cigarette/Vaping Substances     Social History     Tobacco Use    Smoking status: Some Days     Types: Cigarettes    Smokeless tobacco: Never   Vaping Use    Vaping status: Never Used   Substance Use Topics    Alcohol use: No    Drug use: Yes     Types: Marijuana       Review of Systems    Physical Exam  Physical Exam    Vital Signs  ED Triage Vitals [12/25/23 2246]   Temperature Pulse  Respirations Blood Pressure SpO2   98.3 °F (36.8 °C) 81 16 110/68 100 %      Temp Source Heart Rate Source Patient Position - Orthostatic VS BP Location FiO2 (%)   Oral Monitor Lying Left arm --      Pain Score       --           Vitals:    12/25/23 2246 12/26/23 0045 12/26/23 0130   BP: 110/68 108/57 129/96   Pulse: 81 96 75   Patient Position - Orthostatic VS: Lying Lying Lying         Visual Acuity      ED Medications  Medications   ondansetron (ZOFRAN) injection 4 mg (4 mg Intravenous Given 12/25/23 2322)   sodium chloride 0.9 % bolus 1,000 mL (1,000 mL Intravenous New Bag 12/25/23 2322)       Diagnostic Studies  Results Reviewed       Procedure Component Value Units Date/Time    UA w Reflex to Microscopic w Reflex to Culture [[id number]]  (Normal) Collected: 12/26/23 0022    Lab Status: Final result Specimen: Urine, Clean Catch Updated: 12/26/23 0028     Color, UA Yellow     Clarity, UA Clear     Specific Gravity, UA 1.020     pH, UA 7.5     Leukocytes, UA Negative     Nitrite, UA Negative     Protein, UA Negative mg/dl      Glucose, UA Negative mg/dl      Ketones, UA Negative mg/dl      Urobilinogen, UA 0.2 E.U./dl      Bilirubin, UA Negative     Occult Blood, UA Negative    POCT pregnancy, urine [[id number]]  (Normal) Resulted: 12/26/23 0021    Lab Status: Final result Updated: 12/26/23 0021     EXT Preg Test, Ur Negative     Control Valid    FLU/RSV/COVID - if FLU/RSV clinically relevant [[id number]]  (Normal) Collected: 12/25/23 2325    Lab Status: Final result Specimen: Nares from Nose Updated: 12/26/23 0008     SARS-CoV-2 Negative     INFLUENZA A PCR Negative     INFLUENZA B PCR Negative     RSV PCR Negative    Narrative:      FOR PEDIATRIC PATIENTS - copy/paste COVID Guidelines URL to browser: [email or web address]    SARS-CoV-2 assay is a Nucleic Acid Amplification assay intended for the  qualitative detection of nucleic acid from SARS-CoV-2 in  nasopharyngeal  swabs. Results are for the presumptive identification of SARS-CoV-2 RNA.    Positive results are indicative of infection with SARS-CoV-2, the virus  causing COVID-19, but do not rule out bacterial infection or co-infection  with other viruses. Laboratories within the [place] and its  territories are required to report all positive results to the appropriate  public health authorities. Negative results do not preclude SARS-CoV-2  infection and should not be used as the sole basis for treatment or other  patient management decisions. Negative results must be combined with  clinical observations, patient history, and epidemiological information.  This test has not been FDA cleared or approved.    This test has been authorized by FDA under an Emergency Use Authorization  (EUA). This test is only authorized for the duration of time the  declaration that circumstances exist justifying the authorization of the  emergency use of an in vitro diagnostic tests for detection of SARS-CoV-2  virus and/or diagnosis of COVID-19 infection under section 564(b)(1) of  the Act, 21 U.S.C. 360bbb-3(b)(1), unless the authorization is terminated  or revoked sooner. The test has been validated but independent review by FDA  and CLIA is pending.    Test performed using [employer] Gene[employer]pert: This RT-PCR assay targets N2,  a region unique to SARS-CoV-2. A conserved region in the E-gene was chosen  for pan-Sarbecovirus detection which includes SARS-CoV-2.    According to CMS-2020-01-R, this platform meets the definition of high-throughput technology.    Comprehensive metabolic panel [[id number]] Collected: 12/25/23 3984    Lab Status: Final result Specimen: Blood from Arm, Left Updated: 12/25/23 2340     Sodium 138 mmol/L      Potassium 3.7 mmol/L      Chloride 106 mmol/L      CO2 27 mmol/L      ANION GAP 5 mmol/L      BUN 9 mg/dL      Creatinine 0.60 mg/dL      Glucose 104 mg/dL      Calcium 8.5 mg/dL      AST 17 U/L      ALT  8 U/L      Alkaline Phosphatase 59 U/L      Total Protein 6.9 g/dL      Albumin 4.2 g/dL      Total Bilirubin 0.84 mg/dL      eGFR 128 ml/min/1.73sq m     Narrative:      National Kidney Disease Foundation guidelines for Chronic Kidney Disease (CKD):     Stage 1 with normal or high GFR (GFR > 90 mL/min/1.73 square meters)    Stage 2 Mild CKD (GFR = 60-89 mL/min/1.73 square meters)    Stage 3A Moderate CKD (GFR = 45-59 mL/min/1.73 square meters)    Stage 3B Moderate CKD (GFR = 30-44 mL/min/1.73 square meters)    Stage 4 Severe CKD (GFR = 15-29 mL/min/1.73 square meters)    Stage 5 End Stage CKD (GFR <15 mL/min/1.73 square meters)  Note: GFR calculation is accurate only with a steady state creatinine    Lipase [[id number]]  (Normal) Collected: 12/25/23 2325    Lab Status: Final result Specimen: Blood from Arm, Left Updated: 12/25/23 2345     Lipase 20 u/L     CBC and differential [[id number]]  (Abnormal) Collected: 12/25/23 2325    Lab Status: Final result Specimen: Blood from Arm, Left Updated: 12/25/23 2330     WBC 11.62 Thousand/uL      RBC 3.97 Million/uL      Hemoglobin 12.3 g/dL      Hematocrit 36.6 %      MCV 92 fL      MCH 31.0 pg      MCHC 33.6 g/dL      RDW 12.2 %      MPV 10.2 fL      Platelets 247 Thousands/uL      nRBC 0 /100 WBCs      Neutrophils Relative 84 %      Immat GRANS % 0 %      Lymphocytes Relative 7 %      Monocytes Relative 7 %      Eosinophils Relative 2 %      Basophils Relative 0 %      Neutrophils Absolute 9.75 Thousands/µL      Immature Grans Absolute 0.04 Thousand/uL      Lymphocytes Absolute 0.79 Thousands/µL      Monocytes Absolute 0.75 Thousand/µL      Eosinophils Absolute 0.27 Thousand/µL      Basophils Absolute 0.02 Thousands/µL                    No orders to display              Procedures  Procedures         ED Course  ED Course as of 12/26/23 0152   Tue Dec 26, 2023   0117 UA w Reflex to Microscopic w Reflex to Culture  Normal   0117 CBC and differential(!)  No acute actionable  abnormalities, minimal leukocytosis 11.62.   0117 Comprehensive metabolic panel  All within normal limits   0117 Lipase: 20  Normal   0117 FLU/RSV/COVID - if FLU/RSV clinically relevant  Negative   0117 POCT pregnancy, urine  Negative                                             Medical Decision Making  Amount and/or Complexity of Data Reviewed  Labs: ordered. Decision-making details documented in ED Course.    Risk  Prescription drug management.             Disposition  Final diagnoses:   Nausea and vomiting   Gastroenteritis     Time reflects when diagnosis was documented in both MDM as applicable and the Disposition within this note       Time User Action Codes Description Comment    12/26/2023  1:52 AM Joe Tristan [R11.2] Nausea and vomiting     12/26/2023  1:52 AM Joe Tristan [K52.9] Gastroenteritis     12/26/2023  1:52 AM Joe Tristan [R11.2] Nausea and vomiting, unspecified vomiting type           ED Disposition       ED Disposition   Discharge    Condition   Stable    Date/Time   Tue Dec 26, 2023  1:52 AM    Comment   Soila Nolen discharge to home/self care.                   Follow-up Information       Follow up With Specialties Details Why Contact Info Additional Information    Magda Beltran DO Family Medicine Call in 1 week For follow-up [facility] [address] [address] [phone]       [facility] Emergency Department Emergency Medicine Go to  If symptoms worsen [address] [place] [id number] [phone] [facility] Emergency Department, [address] [address]            Current Discharge Medication List        CONTINUE these medications which have CHANGED    Details   ondansetron (ZOFRAN-ODT) 4 mg disintegrating tablet Take 1 tablet (4 mg total) by mouth every 6 (six) hours as needed for nausea or vomiting  Qty: 12 tablet, Refills: 0    Associated Diagnoses: Nausea and vomiting,  unspecified vomiting type           CONTINUE these medications which have NOT CHANGED    Details   albuterol (PROVENTIL HFA,VENTOLIN HFA) 90 mcg/act inhaler Inhale 2 puffs every 6 (six) hours as needed for wheezing      buPROPion (WELLBUTRIN) 75 mg tablet Take 150 mg by mouth daily       famotidine (PEPCID) 20 mg tablet Take 1 tablet (20 mg total) by mouth 2 (two) times a day  Qty: 30 tablet, Refills: 0    Associated Diagnoses: Abdominal pain      OLANZapine (ZyPREXA) 10 mg tablet Take 10 mg by mouth daily at bedtime             No discharge procedures on file.    PDMP Review       None            ED Provider  Electronically Signed by           travel or new foods/medications.  No other symptoms or complaints.    Vital signs reviewed.  See physical exam documentation for exam findings.  Differential diagnosis includes but is not limited to pancreatitis, gastritis, biliary disease, reflux, viral GI illness, viral URI, UTI, and/or pregnancy.  Will evaluate with labs, viral URI panel, urine, urine pregnancy, and treat symptomatically.  See ED course for independent interpretation of results.  Workup overall negative.  Symptoms improved with treatment.  Suspect viral GI illness.  I discussed all findings, treatment, red flags/return precautions, and outpatient follow-up and the patient/family understand and agree. Stable for discharge.    Amount and/or Complexity of Data Reviewed  Labs: ordered. Decision-making details documented in ED Course.    Risk  Prescription drug management.             Disposition  Final diagnoses:   Nausea and vomiting   Gastroenteritis     Time reflects when diagnosis was documented in both MDM as applicable and the Disposition within this note       Time User Action Codes Description Comment    12/26/2023  1:52 AM Joe Tristan [R11.2] Nausea and vomiting     12/26/2023  1:52 AM Joe Tristan [K52.9] Gastroenteritis     12/26/2023  1:52 AM Joe Tristan [R11.2] Nausea and vomiting, unspecified vomiting type           ED Disposition       ED Disposition   Discharge    Condition   Stable    Date/Time   Tue Dec 26, 2023  1:52 AM    Comment   Soila Nolen discharge to home/self care.                   Follow-up Information       Follow up With Specialties Details Why Contact Info Additional Information    Magda Beltran DO Family Medicine Call in 1 week For follow-up [address] [address] [phone]       [facility] Emergency Department Emergency Medicine Go to  If symptoms worsen [address] [place] [address] [phone] [facility]  [place] [facility] Department, [address] [place] [id number]            Current Discharge Medication List        CONTINUE these medications which have CHANGED    Details   ondansetron (ZOFRAN-ODT) 4 mg disintegrating tablet Take 1 tablet (4 mg total) by mouth every 6 (six) hours as needed for nausea or vomiting  Qty: 12 tablet, Refills: 0    Associated Diagnoses: Nausea and vomiting, unspecified vomiting type           CONTINUE these medications which have NOT CHANGED    Details   albuterol (PROVENTIL HFA,VENTOLIN HFA) 90 mcg/act inhaler Inhale 2 puffs every 6 (six) hours as needed for wheezing      buPROPion (WELLBUTRIN) 75 mg tablet Take 150 mg by mouth daily       famotidine (PEPCID) 20 mg tablet Take 1 tablet (20 mg total) by mouth 2 (two) times a day  Qty: 30 tablet, Refills: 0    Associated Diagnoses: Abdominal pain      OLANZapine (ZyPREXA) 10 mg tablet Take 10 mg by mouth daily at bedtime             No discharge procedures on file.    PDMP Review       None            ED Provider  Electronically Signed by             Joe Tristan MD  01/08/24 7720

## 2024-08-18 ENCOUNTER — HOSPITAL ENCOUNTER (EMERGENCY)
Facility: HOSPITAL | Age: 24
Discharge: HOME | End: 2024-08-18
Attending: EMERGENCY MEDICINE
Payer: MEDICARE

## 2024-08-18 ENCOUNTER — APPOINTMENT (EMERGENCY)
Dept: RADIOLOGY | Facility: HOSPITAL | Age: 24
End: 2024-08-18
Payer: MEDICARE

## 2024-08-18 VITALS
BODY MASS INDEX: 22.85 KG/M2 | RESPIRATION RATE: 18 BRPM | SYSTOLIC BLOOD PRESSURE: 130 MMHG | OXYGEN SATURATION: 100 % | TEMPERATURE: 98.2 F | DIASTOLIC BLOOD PRESSURE: 75 MMHG | HEIGHT: 60 IN | HEART RATE: 89 BPM | WEIGHT: 116.4 LBS

## 2024-08-18 DIAGNOSIS — J20.9 ACUTE BRONCHITIS: Primary | ICD-10-CM

## 2024-08-18 PROCEDURE — 71045 X-RAY EXAM CHEST 1 VIEW: CPT

## 2024-08-18 PROCEDURE — 99283 EMERGENCY DEPT VISIT LOW MDM: CPT

## 2024-08-18 PROCEDURE — 99284 EMERGENCY DEPT VISIT MOD MDM: CPT | Performed by: EMERGENCY MEDICINE

## 2024-08-18 NOTE — ED PROVIDER NOTES
History  Chief Complaint   Patient presents with    Cough     Requesting CXR, taking antibiotics and prednisone prescribed by Urgent Care, using neb/inhalers at home     Patient is a 24-year-old female seen in the emergency department with concern for productive cough over approximately the past 3 weeks.  Patient notes some relief with albuterol/nebulizer treatments prior to evaluation in the emergency department.  Patient states that she was recently seen at an outside urgent care, and started on prednisone in addition to an oral cephalosporin.  Patient is requesting chest x-ray to evaluate for possible pneumonia.  Patient notes no fever, chest pain, abdominal pain, nausea, vomiting, weakness. Patient notes no other definite clear exacerbating or alleviating factors for her symptoms.        Prior to Admission Medications   Prescriptions Last Dose Informant Patient Reported? Taking?   OLANZapine (ZyPREXA) 10 mg tablet   Yes No   Sig: Take 10 mg by mouth daily at bedtime   albuterol (PROVENTIL HFA,VENTOLIN HFA) 90 mcg/act inhaler   Yes No   Sig: Inhale 2 puffs every 6 (six) hours as needed for wheezing   buPROPion (WELLBUTRIN) 75 mg tablet   Yes No   Sig: Take 150 mg by mouth daily    famotidine (PEPCID) 20 mg tablet   No No   Sig: Take 1 tablet (20 mg total) by mouth 2 (two) times a day   ondansetron (ZOFRAN-ODT) 4 mg disintegrating tablet   No No   Sig: Take 1 tablet (4 mg total) by mouth every 6 (six) hours as needed for nausea or vomiting      Facility-Administered Medications: None       Past Medical History:   Diagnosis Date    Anxiety     Asthma     Borderline personality disorder (HCC)     Eating disorder     Fibromyalgia     Ovarian cyst     PTSD (post-traumatic stress disorder)     Schizophrenia ([facility])        History reviewed. No pertinent surgical history.    Family History   Problem Relation Age of Onset    Asthma Mother     OCD Mother     Autism Son      I have reviewed and agree with the history as  documented.    E-Cigarette/Vaping    E-Cigarette Use Former User      E-Cigarette/Vaping Substances     Social History     Tobacco Use    Smoking status: Some Days     Types: Cigarettes    Smokeless tobacco: Never   Vaping Use    Vaping status: Former   Substance Use Topics    Alcohol use: No    Drug use: Yes     Types: Marijuana       Review of Systems   Constitutional:  Negative for chills and fever.   HENT:  Negative for ear pain and sore throat.    Eyes:  Negative for pain and visual disturbance.   Respiratory:  Positive for cough and wheezing.    Cardiovascular:  Negative for chest pain and palpitations.   Gastrointestinal:  Negative for abdominal pain and vomiting.   Genitourinary:  Negative for decreased urine volume and difficulty urinating.   Musculoskeletal:  Negative for back pain and neck stiffness.   Skin:  Negative for color change and rash.   Neurological:  Negative for seizures and syncope.   Psychiatric/Behavioral:  Negative for agitation and confusion.        Physical Exam  Physical Exam  Vitals and nursing note reviewed.   Constitutional:       General: She is not in acute distress.     Appearance: She is well-developed.   HENT:      Head: Normocephalic and atraumatic.      Right Ear: External ear normal.      Left Ear: External ear normal.      Nose: Nose normal.      Mouth/Throat:      Pharynx: Oropharynx is clear.   Eyes:      General: No scleral icterus.     Conjunctiva/sclera: Conjunctivae normal.   Cardiovascular:      Rate and Rhythm: Normal rate and regular rhythm.      Heart sounds: No murmur heard.  Pulmonary:      Effort: Pulmonary effort is normal. No respiratory distress.      Breath sounds: Normal breath sounds.   Abdominal:      General: Abdomen is flat. There is no distension.   Musculoskeletal:         General: No deformity or signs of injury.      Cervical back: Normal range of motion and neck supple.   Skin:     General: Skin is warm and dry.   Neurological:      General: No  focal deficit present.      Mental Status: She is alert.      Cranial Nerves: No cranial nerve deficit.      Sensory: No sensory deficit.   Psychiatric:         Mood and Affect: Mood normal.         Thought Content: Thought content normal.         Vital Signs  ED Triage Vitals [08/18/24 1928]   Temperature Pulse Respirations Blood Pressure SpO2   98.2 °F (36.8 °C) 89 18 130/75 100 %      Temp Source Heart Rate Source Patient Position - Orthostatic VS BP Location FiO2 (%)   Oral Monitor Lying Right arm --      Pain Score       No Pain           Vitals:    08/18/24 1928   BP: 130/75   Pulse: 89   Patient Position - Orthostatic VS: Lying         Visual Acuity      ED Medications  Medications - No data to display    Diagnostic Studies  Results Reviewed       None                   XR chest 1 view portable   ED Interpretation by Homero Tejada MD (08/18 2018)   No infiltrate or pneumothorax                 Procedures  Procedures         ED Course                                               Medical Decision Making  Patient is a 24-year-old female seen in the emergency department with concern for cough, with history of wheezing at home.  Chest x-ray showed no infiltrate or pneumothorax.  Patient states that she is already taking albuterol/nebulizer/prednisone/antibiotic medication at home.  Evaluation is not consistent with acute pneumonia. Evaluation is consistent with bronchitis, likely viral in etiology.  Patient declined additional prescriptions in the emergency department. Plan to have patient follow up with PCP/outpatient providers.  Patient stable for discharge home.  Discharge instructions were reviewed with patient.    Amount and/or Complexity of Data Reviewed  Radiology: ordered and independent interpretation performed. Decision-making details documented in ED Course.                 Disposition  Final diagnoses:   Acute bronchitis     Time reflects when diagnosis was documented in both MDM as applicable and  the Disposition within this note       Time User Action Codes Description Comment    8/18/2024  7:39 PM Homero Tejada Add [J21.9] Acute bronchiolitis     8/18/2024  7:39 PM Homero Tejada Remove [J21.9] Acute bronchiolitis     8/18/2024  7:39 PM Homero Tejada Add [J20.9] Acute bronchitis           ED Disposition       ED Disposition   Discharge    Condition   Stable    Date/Time   Sun Aug 18, 2024  8:20 PM    Comment   Soila Nolen discharge to home/self care.                   Follow-up Information       Follow up With Specialties Details Why Contact Info    Magda Beltran DO Family Medicine Call in 1 day  [address] [address] [phone]              Discharge Medication List as of 8/18/2024  8:20 PM        CONTINUE these medications which have NOT CHANGED    Details   albuterol (PROVENTIL HFA,VENTOLIN HFA) 90 mcg/act inhaler Inhale 2 puffs every 6 (six) hours as needed for wheezing, Until Discontinued, Historical Med      buPROPion (WELLBUTRIN) 75 mg tablet Take 150 mg by mouth daily , Historical Med      famotidine (PEPCID) 20 mg tablet Take 1 tablet (20 mg total) by mouth 2 (two) times a day, Starting Fri 4/15/2022, Print      OLANZapine (ZyPREXA) 10 mg tablet Take 10 mg by mouth daily at bedtime, Historical Med      ondansetron (ZOFRAN-ODT) 4 mg disintegrating tablet Take 1 tablet (4 mg total) by mouth every 6 (six) hours as needed for nausea or vomiting, Starting Tue 12/26/2023, Normal             No discharge procedures on file.    PDMP Review       None            ED Provider  Electronically Signed by             Homero Tejada MD  08/18/24 2027

## 2024-10-15 ENCOUNTER — HOSPITAL ENCOUNTER (EMERGENCY)
Facility: HOSPITAL | Age: 24
Discharge: HOME | End: 2024-10-15
Attending: INTERNAL MEDICINE
Payer: MEDICARE

## 2024-10-15 ENCOUNTER — APPOINTMENT (EMERGENCY)
Dept: ULTRASOUND IMAGING | Facility: HOSPITAL | Age: 24
End: 2024-10-15
Payer: MEDICARE

## 2024-10-15 VITALS
HEART RATE: 66 BPM | TEMPERATURE: 97.8 F | OXYGEN SATURATION: 100 % | DIASTOLIC BLOOD PRESSURE: 63 MMHG | RESPIRATION RATE: 20 BRPM | SYSTOLIC BLOOD PRESSURE: 111 MMHG

## 2024-10-15 DIAGNOSIS — D36.9 DERMOID CYST: Primary | ICD-10-CM

## 2024-10-15 LAB
BILIRUB UR QL STRIP: NEGATIVE
CLARITY UR: CLEAR
COLOR UR: YELLOW
EXT PREGNANCY TEST URINE: NEGATIVE
EXT. CONTROL: NORMAL
GLUCOSE UR STRIP-MCNC: NEGATIVE MG/DL
HGB UR QL STRIP.AUTO: NEGATIVE
KETONES UR STRIP-MCNC: NEGATIVE MG/DL
LEUKOCYTE ESTERASE UR QL STRIP: NEGATIVE
NITRITE UR QL STRIP: NEGATIVE
PH UR STRIP.AUTO: 6.5 [PH]
PROT UR STRIP-MCNC: NEGATIVE MG/DL
SP GR UR STRIP.AUTO: <=1.005 (ref 1–1.03)
UROBILINOGEN UR QL STRIP.AUTO: 0.2 E.U./DL

## 2024-10-15 PROCEDURE — 76830 TRANSVAGINAL US NON-OB: CPT

## 2024-10-15 PROCEDURE — 99284 EMERGENCY DEPT VISIT MOD MDM: CPT | Performed by: INTERNAL MEDICINE

## 2024-10-15 PROCEDURE — 99284 EMERGENCY DEPT VISIT MOD MDM: CPT

## 2024-10-15 PROCEDURE — 76856 US EXAM PELVIC COMPLETE: CPT

## 2024-10-15 PROCEDURE — 81025 URINE PREGNANCY TEST: CPT | Performed by: INTERNAL MEDICINE

## 2024-10-15 PROCEDURE — 81003 URINALYSIS AUTO W/O SCOPE: CPT | Performed by: INTERNAL MEDICINE

## 2024-10-15 NOTE — DISCHARGE INSTRUCTIONS
Follow up with your OB/GYN.  TAKE motrin or aleve for pain as needed.  Labs Reviewed   UA W REFLEX TO MICROSCOPIC WITH REFLEX TO CULTURE - Normal       Result Value Ref Range Status    Color, UA Yellow  Yellow Final    Clarity, UA Clear  Clear Final    Specific Gravity, UA <=1.005  1.001 - 1.030 Final    pH, UA 6.5  5.0, 5.5, 6.0, 6.5, 7.0, 7.5, 8.0 Final    Leukocytes, UA Negative  Negative Final    Nitrite, UA Negative  Negative Final    Protein, UA Negative  Negative, Interference- unable to analyze mg/dl Final    Glucose, UA Negative  Negative mg/dl Final    Ketones, UA Negative  Negative mg/dl Final    Urobilinogen, UA 0.2  0.2, 1.0 E.U./dl E.U./dl Final    Bilirubin, UA Negative  Negative Final    Occult Blood, UA Negative  Negative Final   POCT PREGNANCY, URINE - Normal    EXT Preg Test, Ur Negative   Final    Control Valid   Final     US pelvis complete w transvaginal   Final Result   0.7 cm left ovarian hyperechoic focus suggestive of a small dermoid. Consider correlation with nonemergent outpatient MRI.   Trace free fluid in the cul-de-sac, possibly physiologic.   Otherwise unremarkable study.                           Workstation performed: [id number]

## 2024-10-15 NOTE — Clinical Note
Soila Nolen was seen and treated in our emergency department on 10/15/2024.                Diagnosis:     Soila  may return to work on return date.    She may return on this date: 10/16/2024         If you have any questions or concerns, please don't hesitate to call.      Kirti Cruz MD    ______________________________           _______________          _______________  Hospital Representative                              Date                                Time

## 2024-10-15 NOTE — ED PROVIDER NOTES
Time reflects when diagnosis was documented in both MDM as applicable and the Disposition within this note       Time User Action Codes Description Comment    10/15/2024 12:19 PM Kirti Cruz [D36.9] Dermoid cyst           ED Disposition       ED Disposition   Discharge    Condition   Stable    Date/Time   Tue Oct 15, 2024 12:24 PM    Comment   Soila Nolen discharge to home/self care.                   Assessment & Plan       Medical Decision Making  This is a 24 years old came for having suprapubic pain.  Patient is stated that she has uterine pain.  Patient  denies vaginal bleeding, vaginal discharge.  Patient has history of multiple UTIs and PID.  Patient denies vaginal itching.  Patient has irregular menstruation and here last menstruation was last months.  Patient denies abdominal pain.  Patient denies flank pain.  Patient denies history of nephrolithiasis.  Physical exam; abdomen soft benign.  There is no tenderness over suprapubic area of RLQ or LLQ areas..  UA came back negative.  Urine pregnancy negative.  US Pelvis TV; 0.7 cm left ovarian hyperechoic focus suggestive of a small dermoid. Consider correlation with nonemergent outpatient MRI.  Trace free fluid in the cul-de-sac, possibly physiologic.  Otherwise unremarkable study.  Case discussed with the patient informative about the UA and the US.  And the need to follow-up with her OB/GYN doctor.  Advised to take ibuprofen for pain if needed.  Differential diagnoses include but not limited to; ovarian cyst, ovarian torsion, TOA, PID,  Strict return precautions discussed. Patient at time of discharge well-appearing in no acute distress, all questions answered. Patient agreeable to plan.  Patient's vitals, lab/imaging results, diagnosis, and treatment plan were discussed with the patient. All new/changed medications were discussed with patient, specifically, route of administration, how often and when to take, and where they can be picked up.  "Strict return precautions as well as close follow up with PCP was discussed with the patient and the patient was agreeable to my recommendations.  Patient verbally acknowledged understanding of the above communications. All labs reviewed and utilized in the medical decision making process (if labs were ordered). Portions of the record may have been created with voice recognition software.  Occasional wrong word or \"sound a like\" substitutions may have occurred due to the inherent limitations of voice recognition software.  Read the chart carefully and recognize, using context, where substitutions have occurred.        Amount and/or Complexity of Data Reviewed  Labs: ordered.     Details: UA came back negative.  Urine pregnancy negative.    Radiology: ordered.     Details: US Pelvis TV; 0.7 cm left ovarian hyperechoic focus suggestive of a small dermoid. Consider correlation with nonemergent outpatient MRI.  Trace free fluid in the cul-de-sac, possibly physiologic.  Otherwise unremarkable study.               Medications - No data to display    ED Risk Strat Scores                                               History of Present Illness       Chief Complaint   Patient presents with    Abdominal Pain     Patient reports lower pelvic pain that began last night. Denies nausea and vomiting reports only pain        Past Medical History:   Diagnosis Date    Anxiety     Asthma     Borderline personality disorder (HCC)     Eating disorder     Fibromyalgia     Ovarian cyst     PTSD (post-traumatic stress disorder)     Schizophrenia ([facility])       History reviewed. No pertinent surgical history.   Family History   Problem Relation Age of Onset    Asthma Mother     OCD Mother     Autism Son       Social History     Tobacco Use    Smoking status: Some Days     Types: Cigarettes    Smokeless tobacco: Never   Vaping Use    Vaping status: Former   Substance Use Topics    Alcohol use: No    Drug use: Yes     Types: Marijuana    "   E-Cigarette/Vaping    E-Cigarette Use Former User       E-Cigarette/Vaping Substances      I have reviewed and agree with the history as documented.     This is a 24 years old came for having pelvic pain.  Patient is stated that she has pain at the suprapubic area.  Patient had history of UTIs and PID.  Patient denies any vaginal discharge or bleeding.  Last menstruation was last months and she has irregular menstruation.  Patient has no nausea vomiting diarrhea.  Patient denies any dysuria hematuria.  Patient denies any flank pain.  Patient denies history of nephrolithiasis.  Patient has history of depression and anxiety.  Patient sitting comfortable at the ER.        Review of Systems   Constitutional:  Negative for chills and fever.   HENT:  Negative for ear pain and sore throat.    Eyes:  Negative for pain and visual disturbance.   Respiratory:  Negative for cough, shortness of breath and wheezing.    Cardiovascular:  Negative for chest pain and palpitations.   Gastrointestinal:  Negative for abdominal pain and vomiting.   Genitourinary:  Positive for pelvic pain. Negative for decreased urine volume, difficulty urinating, dysuria, flank pain, hematuria, vaginal bleeding, vaginal discharge and vaginal pain.   Musculoskeletal:  Negative for arthralgias and back pain.   Skin:  Negative for color change and rash.   Neurological:  Negative for dizziness, seizures, syncope and headaches.   All other systems reviewed and are negative.          Objective       ED Triage Vitals [10/15/24 1019]   Temperature Pulse Blood Pressure Respirations SpO2 Patient Position - Orthostatic VS   97.8 °F (36.6 °C) 66 111/63 20 100 % Sitting      Temp Source Heart Rate Source BP Location FiO2 (%) Pain Score    Oral Monitor Left arm -- --      Vitals      Date and Time Temp Pulse SpO2 Resp BP Pain Score FACES Pain Rating User   10/15/24 1019 97.8 °F (36.6 °C) 66 100 % 20 111/63 -- -- PH            Physical Exam  Vitals and nursing note  reviewed.   Constitutional:       General: She is not in acute distress.     Appearance: She is well-developed. She is not ill-appearing, toxic-appearing or diaphoretic.   HENT:      Head: Normocephalic and atraumatic.   Eyes:      Extraocular Movements: Extraocular movements intact.      Conjunctiva/sclera: Conjunctivae normal.   Cardiovascular:      Rate and Rhythm: Normal rate and regular rhythm.      Heart sounds: No murmur heard.  Pulmonary:      Effort: Pulmonary effort is normal. No respiratory distress.      Breath sounds: Normal breath sounds.   Abdominal:      General: Abdomen is flat. Bowel sounds are normal. There is no distension or abdominal bruit. There are no signs of injury.      Palpations: Abdomen is soft. There is no shifting dullness, fluid wave, hepatomegaly, splenomegaly, mass or pulsatile mass.      Tenderness: There is no abdominal tenderness. There is no right CVA tenderness, left CVA tenderness, guarding or rebound. Negative signs include Montalvo's sign, Rovsing's sign, McBurney's sign, psoas sign and obturator sign.      Hernia: No hernia is present.   Musculoskeletal:         General: No swelling.      Cervical back: Neck supple.   Skin:     General: Skin is warm and dry.      Capillary Refill: Capillary refill takes less than 2 seconds.      Coloration: Skin is not cyanotic, jaundiced, mottled or pale.      Findings: No erythema or rash.   Neurological:      Mental Status: She is alert and oriented to person, place, and time.   Psychiatric:         Mood and Affect: Mood normal.         Results Reviewed       Procedure Component Value Units Date/Time    UA w Reflex to Microscopic w Reflex to Culture [[id number]]  (Normal) Collected: 10/15/24 1052    Lab Status: Final result Specimen: Urine, Clean Catch Updated: 10/15/24 1100     Color, UA Yellow     Clarity, UA Clear     Specific Gravity, UA <=1.005     pH, UA 6.5     Leukocytes, UA Negative     Nitrite, UA Negative     Protein, UA  Negative mg/dl      Glucose, UA Negative mg/dl      Ketones, UA Negative mg/dl      Urobilinogen, UA 0.2 E.U./dl      Bilirubin, UA Negative     Occult Blood, UA Negative    POCT pregnancy, urine [[id number]]  (Normal) Resulted: 10/15/24 1059    Lab Status: Final result Updated: 10/15/24 1059     EXT Preg Test, Ur Negative     Control Valid            US pelvis complete w transvaginal   Final Interpretation by Bonnie Mcdonough MD (10/15 1150)   0.7 cm left ovarian hyperechoic focus suggestive of a small dermoid. Consider correlation with nonemergent outpatient MRI.   Trace free fluid in the cul-de-sac, possibly physiologic.   Otherwise unremarkable study.                           Workstation performed: [id number]             Procedures    ED Medication and Procedure Management   Prior to Admission Medications   Prescriptions Last Dose Informant Patient Reported? Taking?   OLANZapine (ZyPREXA) 10 mg tablet   Yes No   Sig: Take 10 mg by mouth daily at bedtime   albuterol (PROVENTIL HFA,VENTOLIN HFA) 90 mcg/act inhaler   Yes No   Sig: Inhale 2 puffs every 6 (six) hours as needed for wheezing   buPROPion (WELLBUTRIN) 75 mg tablet   Yes No   Sig: Take 150 mg by mouth daily    famotidine (PEPCID) 20 mg tablet   No No   Sig: Take 1 tablet (20 mg total) by mouth 2 (two) times a day   ondansetron (ZOFRAN-ODT) 4 mg disintegrating tablet   No No   Sig: Take 1 tablet (4 mg total) by mouth every 6 (six) hours as needed for nausea or vomiting      Facility-Administered Medications: None     Discharge Medication List as of 10/15/2024 12:25 PM        CONTINUE these medications which have NOT CHANGED    Details   albuterol (PROVENTIL HFA,VENTOLIN HFA) 90 mcg/act inhaler Inhale 2 puffs every 6 (six) hours as needed for wheezing, Until Discontinued, Historical Med      buPROPion (WELLBUTRIN) 75 mg tablet Take 150 mg by mouth daily , Historical Med      famotidine (PEPCID) 20 mg tablet Take 1 tablet (20 mg total) by mouth 2 (two) times  a day, Starting Fri 4/15/2022, Print      OLANZapine (ZyPREXA) 10 mg tablet Take 10 mg by mouth daily at bedtime, Historical Med      ondansetron (ZOFRAN-ODT) 4 mg disintegrating tablet Take 1 tablet (4 mg total) by mouth every 6 (six) hours as needed for nausea or vomiting, Starting Tue 12/26/2023, Normal           No discharge procedures on file.  ED SEPSIS DOCUMENTATION   Time reflects when diagnosis was documented in both MDM as applicable and the Disposition within this note       Time User Action Codes Description Comment    10/15/2024 12:19 PM Kirti Cruz Add [D36.9] Dermoid cyst                  Kirti Cruz MD  10/16/24 0711

## 2024-10-17 ENCOUNTER — OFFICE VISIT (OUTPATIENT)
Dept: OBGYN CLINIC | Facility: CLINIC | Age: 24
End: 2024-10-17
Payer: MEDICARE

## 2024-10-17 VITALS
WEIGHT: 121.6 LBS | DIASTOLIC BLOOD PRESSURE: 60 MMHG | BODY MASS INDEX: 23.87 KG/M2 | SYSTOLIC BLOOD PRESSURE: 98 MMHG | HEIGHT: 60 IN

## 2024-10-17 DIAGNOSIS — N94.2 VAGINISMUS: ICD-10-CM

## 2024-10-17 DIAGNOSIS — R10.2 CHRONIC PELVIC PAIN IN FEMALE: Primary | ICD-10-CM

## 2024-10-17 DIAGNOSIS — N94.0 OVULATORY PAIN: ICD-10-CM

## 2024-10-17 DIAGNOSIS — G89.29 CHRONIC PELVIC PAIN IN FEMALE: Primary | ICD-10-CM

## 2024-10-17 DIAGNOSIS — D27.1 DERMOID CYST OF LEFT OVARY: ICD-10-CM

## 2024-10-17 DIAGNOSIS — Z30.011 ENCOUNTER FOR INITIAL PRESCRIPTION OF CONTRACEPTIVE PILLS: ICD-10-CM

## 2024-10-17 PROCEDURE — 99202 OFFICE O/P NEW SF 15 MIN: CPT | Performed by: PHYSICIAN ASSISTANT

## 2024-10-17 RX ORDER — CEFDINIR 300 MG/1
1 CAPSULE ORAL 2 TIMES DAILY
COMMUNITY
Start: 2024-08-14 | End: 2024-10-17

## 2024-10-17 RX ORDER — DESOGESTREL AND ETHINYL ESTRADIOL 0.15-0.03
1 KIT ORAL DAILY
Qty: 84 TABLET | Refills: 1 | Status: SHIPPED | OUTPATIENT
Start: 2024-10-17
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
## 2024-10-18 PROBLEM — R53.82 CHRONIC FATIGUE: Status: ACTIVE | Noted: 2022-01-05

## 2024-10-18 PROBLEM — F41.9 ANXIETY: Chronic | Status: ACTIVE | Noted: 2024-10-18

## 2024-10-18 PROBLEM — G89.29 CHRONIC PELVIC PAIN IN FEMALE: Status: ACTIVE | Noted: 2021-11-04

## 2024-10-18 PROBLEM — F31.9 BIPOLAR DISORDER WITH DEPRESSION (HCC): Status: ACTIVE | Noted: 2019-11-04

## 2024-10-18 PROBLEM — G43.009 MIGRAINE WITHOUT AURA AND WITHOUT STATUS MIGRAINOSUS, NOT INTRACTABLE: Status: ACTIVE | Noted: 2019-03-20

## 2024-10-18 PROBLEM — F95.8 MOTOR TIC DISORDER: Status: ACTIVE | Noted: 2021-11-04

## 2024-10-18 PROBLEM — F43.10 PTSD (POST-TRAUMATIC STRESS DISORDER): Status: ACTIVE | Noted: 2024-10-18

## 2024-10-18 PROBLEM — R00.2 PALPITATIONS: Status: ACTIVE | Noted: 2022-01-05

## 2024-10-18 PROBLEM — K02.9 DENTAL CARIES: Status: ACTIVE | Noted: 2022-06-22

## 2024-10-18 PROBLEM — M79.7 FIBROMYALGIA: Chronic | Status: ACTIVE | Noted: 2019-03-04

## 2024-10-18 PROBLEM — T76.21XA: Status: ACTIVE | Noted: 2018-12-17

## 2024-10-18 PROBLEM — R10.2 CHRONIC PELVIC PAIN IN FEMALE: Status: ACTIVE | Noted: 2021-11-04

## 2024-10-18 PROBLEM — E55.9 VITAMIN D DEFICIENCY: Status: ACTIVE | Noted: 2019-01-30

## 2024-10-18 PROBLEM — F41.8 DEPRESSION WITH ANXIETY: Status: ACTIVE | Noted: 2017-01-27

## 2024-10-18 PROBLEM — R56.9 SEIZURE (HCC): Status: ACTIVE | Noted: 2021-11-04

## 2024-10-18 PROBLEM — F50.9 EATING DISORDER: Status: ACTIVE | Noted: 2018-08-14

## 2024-10-18 PROBLEM — L20.82 FLEXURAL ECZEMA: Status: ACTIVE | Noted: 2017-12-14

## 2024-10-18 PROBLEM — G47.9 SLEEP DISORDER: Status: ACTIVE | Noted: 2019-03-20

## 2024-10-18 PROBLEM — E53.8 VITAMIN B12 DEFICIENCY: Status: ACTIVE | Noted: 2019-01-30

## 2024-10-21 ENCOUNTER — HOSPITAL ENCOUNTER (EMERGENCY)
Facility: HOSPITAL | Age: 24
Discharge: HOME | End: 2024-10-21
Attending: EMERGENCY MEDICINE | Admitting: EMERGENCY MEDICINE
Payer: MEDICARE

## 2024-10-21 VITALS
DIASTOLIC BLOOD PRESSURE: 82 MMHG | OXYGEN SATURATION: 100 % | RESPIRATION RATE: 18 BRPM | HEART RATE: 57 BPM | SYSTOLIC BLOOD PRESSURE: 115 MMHG | BODY MASS INDEX: 22.56 KG/M2 | TEMPERATURE: 97.9 F | WEIGHT: 115.52 LBS

## 2024-10-21 DIAGNOSIS — R11.2 NAUSEA AND VOMITING: Primary | ICD-10-CM

## 2024-10-21 LAB
ALBUMIN SERPL BCG-MCNC: 4.5 G/DL (ref 3.5–5)
ALP SERPL-CCNC: 59 U/L (ref 34–104)
ALT SERPL W P-5'-P-CCNC: 11 U/L (ref 7–52)
ANION GAP SERPL CALCULATED.3IONS-SCNC: 8 MMOL/L (ref 4–13)
AST SERPL W P-5'-P-CCNC: 21 U/L (ref 13–39)
BASOPHILS # BLD AUTO: 0.04 THOUSANDS/ΜL (ref 0–0.1)
BASOPHILS NFR BLD AUTO: 1 % (ref 0–1)
BILIRUB SERPL-MCNC: 1.32 MG/DL (ref 0.2–1)
BUN SERPL-MCNC: 11 MG/DL (ref 5–25)
CALCIUM SERPL-MCNC: 9.3 MG/DL (ref 8.4–10.2)
CHLORIDE SERPL-SCNC: 103 MMOL/L (ref 96–108)
CO2 SERPL-SCNC: 25 MMOL/L (ref 21–32)
CREAT SERPL-MCNC: 0.77 MG/DL (ref 0.6–1.3)
EOSINOPHIL # BLD AUTO: 0.2 THOUSAND/ΜL (ref 0–0.61)
EOSINOPHIL NFR BLD AUTO: 3 % (ref 0–6)
ERYTHROCYTE [DISTWIDTH] IN BLOOD BY AUTOMATED COUNT: 12.3 % (ref 11.6–15.1)
GFR SERPL CREATININE-BSD FRML MDRD: 108 ML/MIN/1.73SQ M
GLUCOSE SERPL-MCNC: 104 MG/DL (ref 65–140)
HCG SERPL QL: NEGATIVE
HCT VFR BLD AUTO: 37 % (ref 34.8–46.1)
HGB BLD-MCNC: 12.3 G/DL (ref 11.5–15.4)
IMM GRANULOCYTES # BLD AUTO: 0.01 THOUSAND/UL (ref 0–0.2)
IMM GRANULOCYTES NFR BLD AUTO: 0 % (ref 0–2)
LIPASE SERPL-CCNC: 22 U/L (ref 11–82)
LYMPHOCYTES # BLD AUTO: 2.46 THOUSANDS/ΜL (ref 0.6–4.47)
LYMPHOCYTES NFR BLD AUTO: 35 % (ref 14–44)
MCH RBC QN AUTO: 30.4 PG (ref 26.8–34.3)
MCHC RBC AUTO-ENTMCNC: 33.2 G/DL (ref 31.4–37.4)
MCV RBC AUTO: 92 FL (ref 82–98)
MONOCYTES # BLD AUTO: 0.77 THOUSAND/ΜL (ref 0.17–1.22)
MONOCYTES NFR BLD AUTO: 11 % (ref 4–12)
NEUTROPHILS # BLD AUTO: 3.59 THOUSANDS/ΜL (ref 1.85–7.62)
NEUTS SEG NFR BLD AUTO: 50 % (ref 43–75)
NRBC BLD AUTO-RTO: 0 /100 WBCS
PLATELET # BLD AUTO: 248 THOUSANDS/UL (ref 149–390)
PMV BLD AUTO: 10.6 FL (ref 8.9–12.7)
POTASSIUM SERPL-SCNC: 3.5 MMOL/L (ref 3.5–5.3)
PROT SERPL-MCNC: 7.6 G/DL (ref 6.4–8.4)
RBC # BLD AUTO: 4.04 MILLION/UL (ref 3.81–5.12)
SODIUM SERPL-SCNC: 136 MMOL/L (ref 135–147)
WBC # BLD AUTO: 7.07 THOUSAND/UL (ref 4.31–10.16)

## 2024-10-21 PROCEDURE — 96374 THER/PROPH/DIAG INJ IV PUSH: CPT

## 2024-10-21 PROCEDURE — 36415 COLL VENOUS BLD VENIPUNCTURE: CPT | Performed by: EMERGENCY MEDICINE

## 2024-10-21 PROCEDURE — 80053 COMPREHEN METABOLIC PANEL: CPT | Performed by: EMERGENCY MEDICINE

## 2024-10-21 PROCEDURE — 96361 HYDRATE IV INFUSION ADD-ON: CPT

## 2024-10-21 PROCEDURE — 99284 EMERGENCY DEPT VISIT MOD MDM: CPT | Performed by: EMERGENCY MEDICINE

## 2024-10-21 PROCEDURE — 83690 ASSAY OF LIPASE: CPT | Performed by: EMERGENCY MEDICINE

## 2024-10-21 PROCEDURE — 99283 EMERGENCY DEPT VISIT LOW MDM: CPT

## 2024-10-21 PROCEDURE — 85025 COMPLETE CBC W/AUTO DIFF WBC: CPT | Performed by: EMERGENCY MEDICINE

## 2024-10-21 PROCEDURE — 84703 CHORIONIC GONADOTROPIN ASSAY: CPT | Performed by: EMERGENCY MEDICINE

## 2024-10-21 RX ORDER — ONDANSETRON 2 MG/ML
4 INJECTION INTRAMUSCULAR; INTRAVENOUS ONCE
Status: COMPLETED | OUTPATIENT
Start: 2024-10-21 | End: 2024-10-21

## 2024-10-21 RX ORDER — ONDANSETRON 4 MG/1
4 TABLET, ORALLY DISINTEGRATING ORAL EVERY 6 HOURS PRN
Qty: 20 TABLET | Refills: 0 | Status: SHIPPED | OUTPATIENT
Start: 2024-10-21

## 2024-10-21 RX ADMIN — SODIUM CHLORIDE 1000 ML: 0.9 INJECTION, SOLUTION INTRAVENOUS at 16:49

## 2024-10-21 RX ADMIN — ONDANSETRON 4 MG: 2 INJECTION INTRAMUSCULAR; INTRAVENOUS at 16:48

## 2024-10-21 NOTE — ED PROVIDER NOTES
Time reflects when diagnosis was documented in both MDM as applicable and the Disposition within this note       Time User Action Codes Description Comment    10/21/2024  5:44 PM Elias Sosa [R11.2] Nausea and vomiting           ED Disposition       ED Disposition   Discharge    Condition   Stable    Date/Time   Mon Oct 21, 2024  5:44 PM    Comment   Soila Nolen discharge to home/self care.                   Assessment & Plan       Medical Decision Making  24-year-old female presented to the emergency department for evaluation of nausea and vomiting.  On arrival patient was awake, alert, oriented and in no acute distress.  Vital signs within normal limits.  Physical exam unremarkable including a benign abdominal examination.  No reproducible abdominal tenderness.  Blood work overall reassuring.  Patient treated with Zofran and a fluid bolus.  On reevaluation the patient reported that her symptoms had resolved.  Patient successfully passed a p.o. challenge.  All diagnostic studies were discussed with the patient in detail.  She is appropriate for discharge.  The patient was provided with a prescription for Zofran.  Recommendation was made for the patient to follow-up with her PCP for continued symptoms.  Return precautions were discussed.    Patient agrees with the plan for discharge and feels comfortable to go home with proper f/u. Advised to return for worsening or additional problems. Diagnostic tests were reviewed and questions answered. Diagnosis, care plan and treatment options were discussed. The patient understands instructions and will follow up as directed.        Amount and/or Complexity of Data Reviewed  Labs: ordered.    Risk  Prescription drug management.             Medications   ondansetron (ZOFRAN) injection 4 mg (4 mg Intravenous Given 10/21/24 2250)   sodium chloride 0.9 % bolus 1,000 mL (0 mL Intravenous Stopped 10/21/24 3391)       ED Risk Strat Scores        "                    SBIRT 22yo+      Flowsheet Row Most Recent Value   Initial Alcohol Screen: US AUDIT-C     1. How often do you have a drink containing alcohol? 0 Filed at: 10/21/2024 1629   2. How many drinks containing alcohol do you have on a typical day you are drinking?  0 Filed at: 10/21/2024 1629   3a. Male UNDER 65: How often do you have five or more drinks on one occasion? 0 Filed at: 10/21/2024 1629   3b. FEMALE Any Age, or MALE 65+: How often do you have 4 or more drinks on one occassion? 0 Filed at: 10/21/2024 1629   Audit-C Score 0 Filed at: 10/21/2024 1629   IMAN: How many times in the past year have you...    Used an illegal drug or used a prescription medication for non-medical reasons? Never Filed at: 10/21/2024 1629                            History of Present Illness       Chief Complaint   Patient presents with    Vomiting     Pt presents to the ed with \"food poisoning\" reports eating pizza yesterday and vomiting 30 mins after eating and vomiting on and off until this morning, reports this has happened before       Past Medical History:   Diagnosis Date    Anxiety     Asthma     Borderline personality disorder (HCC)     Eating disorder     Fibromyalgia     Ovarian cyst     PTSD (post-traumatic stress disorder)     Schizophrenia (HCC)       History reviewed. No pertinent surgical history.   Family History   Problem Relation Age of Onset    Asthma Mother     OCD Mother     Autism Son       Social History     Tobacco Use    Smoking status: Some Days     Types: Cigarettes    Smokeless tobacco: Never   Vaping Use    Vaping status: Former   Substance Use Topics    Alcohol use: No    Drug use: Yes     Types: Marijuana      E-Cigarette/Vaping    E-Cigarette Use Former User       E-Cigarette/Vaping Substances      I have reviewed and agree with the history as documented.     24-year-old female presents to the emergency department for evaluation of nausea and vomiting.  The patient reports eating pizza " yesterday evening and approximately 30 minutes after eating the pizza she started vomiting.  She reports having multiple episodes of nonbloody and nonbilious vomiting until approximately 6:45 AM this morning.  She states that she has not eaten anything all day because she continues to feel nauseous and is afraid that she will vomit if she eats anything.  She reports having some mild epigastric discomfort that was present when she was vomiting.  Reports that prior to eating the pizza that she was in her usual state of health.  No fevers, chills, diarrhea, sick contacts, recent travel or urinary symptoms.        Review of Systems   Constitutional:  Negative for chills and fever.   HENT:  Negative for ear pain and sore throat.    Eyes:  Negative for pain and visual disturbance.   Respiratory:  Negative for cough and shortness of breath.    Cardiovascular:  Negative for chest pain and palpitations.   Gastrointestinal:  Positive for abdominal pain, nausea and vomiting.   Genitourinary:  Negative for dysuria and hematuria.   Musculoskeletal:  Negative for arthralgias and back pain.   Skin:  Negative for color change and rash.   Neurological:  Negative for seizures and syncope.   All other systems reviewed and are negative.          Objective       ED Triage Vitals [10/21/24 1624]   Temperature Pulse Blood Pressure Respirations SpO2 Patient Position - Orthostatic VS   97.9 °F (36.6 °C) 69 118/74 18 100 % Sitting      Temp Source Heart Rate Source BP Location FiO2 (%) Pain Score    Oral Monitor Left arm -- 5      Vitals      Date and Time Temp Pulse SpO2 Resp BP Pain Score FACES Pain Rating User   10/21/24 1831 -- 57 100 % 18 115/82 -- -- RN   10/21/24 1624 97.9 °F (36.6 °C) 69 100 % 18 118/74 5 -- AM            Physical Exam  Vitals and nursing note reviewed.   Constitutional:       General: She is not in acute distress.     Appearance: She is well-developed.   HENT:      Head: Normocephalic and atraumatic.   Eyes:       Conjunctiva/sclera: Conjunctivae normal.   Cardiovascular:      Rate and Rhythm: Normal rate and regular rhythm.      Heart sounds: No murmur heard.  Pulmonary:      Effort: Pulmonary effort is normal. No respiratory distress.      Breath sounds: Normal breath sounds.   Abdominal:      Palpations: Abdomen is soft.      Tenderness: There is no abdominal tenderness.   Musculoskeletal:         General: No swelling.      Cervical back: Neck supple.   Skin:     General: Skin is warm and dry.      Capillary Refill: Capillary refill takes less than 2 seconds.   Neurological:      Mental Status: She is alert.   Psychiatric:         Mood and Affect: Mood normal.         Results Reviewed       Procedure Component Value Units Date/Time    hCG, qualitative pregnancy [[id number]]  (Normal) Collected: 10/21/24 1647    Lab Status: Final result Specimen: Blood from Arm, Left Updated: 10/21/24 1719     Preg, Serum Negative    Comprehensive metabolic panel [[id number]]  (Abnormal) Collected: 10/21/24 1647    Lab Status: Final result Specimen: Blood from Arm, Left Updated: 10/21/24 1712     Sodium 136 mmol/L      Potassium 3.5 mmol/L      Chloride 103 mmol/L      CO2 25 mmol/L      ANION GAP 8 mmol/L      BUN 11 mg/dL      Creatinine 0.77 mg/dL      Glucose 104 mg/dL      Calcium 9.3 mg/dL      AST 21 U/L      ALT 11 U/L      Alkaline Phosphatase 59 U/L      Total Protein 7.6 g/dL      Albumin 4.5 g/dL      Total Bilirubin 1.32 mg/dL      eGFR 108 ml/min/1.73sq m     Narrative:      National Kid[employer] guidelines for Chronic Kidney Disease (CKD):     Stage 1 with normal or high GFR (GFR > 90 mL/min/1.73 square meters)    Stage 2 Mild CKD (GFR = 60-89 mL/min/1.73 square meters)    Stage 3A Moderate CKD (GFR = 45-59 mL/min/1.73 square meters)    Stage 3B Moderate CKD (GFR = 30-44 mL/min/1.73 square meters)    Stage 4 Severe CKD (GFR = 15-29 mL/min/1.73 square meters)    Stage 5 End Stage CKD (GFR <15 mL/min/1.73 square  meters)  Note: GFR calculation is accurate only with a steady state creatinine    Lipase [[id number]]  (Normal) Collected: 10/21/24 1647    Lab Status: Final result Specimen: Blood from Arm, Left Updated: 10/21/24 1712     Lipase 22 u/L     CBC and differential [[id number]] Collected: 10/21/24 1647    Lab Status: Final result Specimen: Blood from Arm, Left Updated: 10/21/24 1656     WBC 7.07 Thousand/uL      RBC 4.04 Million/uL      Hemoglobin 12.3 g/dL      Hematocrit 37.0 %      MCV 92 fL      MCH 30.4 pg      MCHC 33.2 g/dL      RDW 12.3 %      MPV 10.6 fL      Platelets 248 Thousands/uL      nRBC 0 /100 WBCs      Segmented % 50 %      Immature Grans % 0 %      Lymphocytes % 35 %      Monocytes % 11 %      Eosinophils Relative 3 %      Basophils Relative 1 %      Absolute Neutrophils 3.59 Thousands/µL      Absolute Immature Grans 0.01 Thousand/uL      Absolute Lymphocytes 2.46 Thousands/µL      Absolute Monocytes 0.77 Thousand/µL      Eosinophils Absolute 0.20 Thousand/µL      Basophils Absolute 0.04 Thousands/µL             No orders to display       Procedures    ED Medication and Procedure Management   Prior to Admission Medications   Prescriptions Last Dose Informant Patient Reported? Taking?   albuterol (PROVENTIL HFA,VENTOLIN HFA) 90 mcg/act inhaler   Yes No   Sig: Inhale 2 puffs every 6 (six) hours as needed for wheezing   budesonide (RINOCORT AQUA) 32 MCG/ACT nasal spray   Yes No   Si-2 sprays into each nostril   desogestrel-ethinyl estradiol (APRI) 0.15-30 MG-MCG per tablet   No No   Sig: Take 1 tablet by mouth daily   famotidine (PEPCID) 20 mg tablet   No No   Sig: Take 1 tablet (20 mg total) by mouth 2 (two) times a day   ondansetron (ZOFRAN-ODT) 4 mg disintegrating tablet   No No   Sig: Take 1 tablet (4 mg total) by mouth every 6 (six) hours as needed for nausea or vomiting   Patient not taking: Reported on 10/17/2024      Facility-Administered Medications: None     Discharge Medication List as of  10/21/2024  6:38 PM        CONTINUE these medications which have CHANGED    Details   ondansetron (ZOFRAN-ODT) 4 mg disintegrating tablet Take 1 tablet (4 mg total) by mouth every 6 (six) hours as needed for nausea or vomiting, Starting Mon 10/21/2024, Normal           CONTINUE these medications which have NOT CHANGED    Details   albuterol (PROVENTIL HFA,VENTOLIN HFA) 90 mcg/act inhaler Inhale 2 puffs every 6 (six) hours as needed for wheezing, Until Discontinued, Historical Med      budesonide (RINOCORT AQUA) 32 MCG/ACT nasal spray 1-2 sprays into each nostril, Starting Wed 8/14/2024, Historical Med      desogestrel-ethinyl estradiol (APRI) 0.15-30 MG-MCG per tablet Take 1 tablet by mouth daily, Starting Thu 10/17/2024, Normal      famotidine (PEPCID) 20 mg tablet Take 1 tablet (20 mg total) by mouth 2 (two) times a day, Starting Fri 4/15/2022, Print           No discharge procedures on file.  ED SEPSIS DOCUMENTATION   Time reflects when diagnosis was documented in both MDM as applicable and the Disposition within this note       Time User Action Codes Description Comment    10/21/2024  5:44 PM Elias Sosa Add [R11.2] Nausea and vomiting                  Elias Sosa MD  10/21/24 6004

## 2024-10-21 NOTE — Clinical Note
Soila Nolen was seen and treated in our emergency department on 10/21/2024.    No restrictions            Diagnosis: vomiting    Soila  may return to work on return date.    She may return on this date: 10/22/2024         If you have any questions or concerns, please don't hesitate to call.      Elias Sosa MD    ______________________________           _______________          _______________  Hospital Representative                              Date                                Time

## 2024-11-26 DIAGNOSIS — Z30.011 ENCOUNTER FOR INITIAL PRESCRIPTION OF CONTRACEPTIVE PILLS: ICD-10-CM

## 2024-11-26 DIAGNOSIS — R10.2 CHRONIC PELVIC PAIN IN FEMALE: ICD-10-CM

## 2024-11-26 DIAGNOSIS — G89.29 CHRONIC PELVIC PAIN IN FEMALE: ICD-10-CM

## 2024-11-26 DIAGNOSIS — N94.0 OVULATORY PAIN: ICD-10-CM

## 2024-11-27 RX ORDER — DESOGESTREL AND ETHINYL ESTRADIOL 0.15-0.03
1 KIT ORAL DAILY
Qty: 84 TABLET | Refills: 1 | Status: SHIPPED | OUTPATIENT
Start: 2024-11-27

## 2025-01-21 ENCOUNTER — HOSPITAL ENCOUNTER (EMERGENCY)
Facility: HOSPITAL | Age: 25
Discharge: HOME | End: 2025-01-21
Attending: EMERGENCY MEDICINE | Admitting: EMERGENCY MEDICINE
Payer: MEDICARE

## 2025-01-21 VITALS
RESPIRATION RATE: 18 BRPM | TEMPERATURE: 100.4 F | OXYGEN SATURATION: 98 % | HEART RATE: 113 BPM | SYSTOLIC BLOOD PRESSURE: 126 MMHG | DIASTOLIC BLOOD PRESSURE: 74 MMHG

## 2025-01-21 DIAGNOSIS — R05.9 COUGH: ICD-10-CM

## 2025-01-21 DIAGNOSIS — J10.1 INFLUENZA A: Primary | ICD-10-CM

## 2025-01-21 DIAGNOSIS — R11.0 NAUSEA: ICD-10-CM

## 2025-01-21 LAB
EXT PREGNANCY TEST URINE: NEGATIVE
EXT. CONTROL: NORMAL
FLUAV AG UPPER RESP QL IA.RAPID: POSITIVE
FLUBV AG UPPER RESP QL IA.RAPID: NEGATIVE
S PYO DNA THROAT QL NAA+PROBE: NOT DETECTED
SARS-COV+SARS-COV-2 AG RESP QL IA.RAPID: NEGATIVE

## 2025-01-21 PROCEDURE — 87651 STREP A DNA AMP PROBE: CPT

## 2025-01-21 PROCEDURE — 96374 THER/PROPH/DIAG INJ IV PUSH: CPT

## 2025-01-21 PROCEDURE — 99283 EMERGENCY DEPT VISIT LOW MDM: CPT

## 2025-01-21 PROCEDURE — 99284 EMERGENCY DEPT VISIT MOD MDM: CPT | Performed by: EMERGENCY MEDICINE

## 2025-01-21 PROCEDURE — 81025 URINE PREGNANCY TEST: CPT

## 2025-01-21 PROCEDURE — 87804 INFLUENZA ASSAY W/OPTIC: CPT

## 2025-01-21 PROCEDURE — 87811 SARS-COV-2 COVID19 W/OPTIC: CPT

## 2025-01-21 PROCEDURE — 96375 TX/PRO/DX INJ NEW DRUG ADDON: CPT

## 2025-01-21 RX ORDER — BENZONATATE 100 MG/1
100 CAPSULE ORAL EVERY 8 HOURS
Qty: 10 CAPSULE | Refills: 0 | Status: SHIPPED | OUTPATIENT
Start: 2025-01-21

## 2025-01-21 RX ORDER — ACETAMINOPHEN 325 MG/1
650 TABLET ORAL ONCE
Status: DISCONTINUED | OUTPATIENT
Start: 2025-01-21 | End: 2025-01-21

## 2025-01-21 RX ORDER — ONDANSETRON 2 MG/ML
4 INJECTION INTRAMUSCULAR; INTRAVENOUS ONCE
Status: COMPLETED | OUTPATIENT
Start: 2025-01-21 | End: 2025-01-21

## 2025-01-21 RX ORDER — ACETAMINOPHEN 10 MG/ML
1000 INJECTION, SOLUTION INTRAVENOUS ONCE
Status: DISCONTINUED | OUTPATIENT
Start: 2025-01-21 | End: 2025-01-21

## 2025-01-21 RX ORDER — KETOROLAC TROMETHAMINE 30 MG/ML
15 INJECTION, SOLUTION INTRAMUSCULAR; INTRAVENOUS ONCE
Status: COMPLETED | OUTPATIENT
Start: 2025-01-21 | End: 2025-01-21

## 2025-01-21 RX ORDER — BENZONATATE 100 MG/1
100 CAPSULE ORAL ONCE
Status: COMPLETED | OUTPATIENT
Start: 2025-01-21 | End: 2025-01-21

## 2025-01-21 RX ORDER — ONDANSETRON 4 MG/1
4 TABLET, FILM COATED ORAL EVERY 6 HOURS
Qty: 10 TABLET | Refills: 0 | Status: SHIPPED | OUTPATIENT
Start: 2025-01-21

## 2025-01-21 RX ORDER — GUAIFENESIN 600 MG/1
600 TABLET, EXTENDED RELEASE ORAL ONCE
Status: COMPLETED | OUTPATIENT
Start: 2025-01-21 | End: 2025-01-21

## 2025-01-21 RX ADMIN — SODIUM CHLORIDE 500 ML: 0.9 INJECTION, SOLUTION INTRAVENOUS at 14:30

## 2025-01-21 RX ADMIN — KETOROLAC TROMETHAMINE 15 MG: 30 INJECTION, SOLUTION INTRAMUSCULAR at 15:27

## 2025-01-21 RX ADMIN — GUAIFENESIN 600 MG: 600 TABLET ORAL at 14:32

## 2025-01-21 RX ADMIN — BENZONATATE 100 MG: 100 CAPSULE ORAL at 14:32

## 2025-01-21 RX ADMIN — ONDANSETRON 4 MG: 2 INJECTION INTRAMUSCULAR; INTRAVENOUS at 14:32

## 2025-01-21 NOTE — DISCHARGE INSTRUCTIONS
Please take Tessalon Perles as needed to help with your cough.  Please take Zofran as needed to help with your nausea.

## 2025-01-21 NOTE — ED ATTENDING ATTESTATION
1/21/2025  I, Elias Sosa MD, saw and evaluated the patient. I have discussed the patient with the resident/non-physician practitioner and agree with the resident's/non-physician practitioner's findings, Plan of Care, and MDM as documented in the resident's/non-physician practitioner's note, except where noted. All available labs and Radiology studies were reviewed.  I was present for key portions of any procedure(s) performed by the resident/non-physician practitioner and I was immediately available to provide assistance.       At this point I agree with the current assessment done in the Emergency Department.  I have conducted an independent evaluation of this patient a history and physical is as follows:    24-year-old female presents to the emergency department for evaluation of flulike symptoms.  The patient reports that over the past 2 days having chills, fatigue, headache, sore throat and nausea.  She has not been taking any medications to treat her symptoms.  Patient describes her headache as a gradual onset bilateral pressure that is similar to headaches that she has had in the past.  No blurry vision, neck pain or rashes.  No urinary symptoms.  No shortness of breath.  No chest pain.  No localized numbness, tingling or weakness.    A 10 point review of systems was conducted and negative except for as stated above in HPI.    Physical Exam  Vitals and nursing note reviewed.   Constitutional:       General: She is not in acute distress.     Appearance: She is well-developed.   HENT:      Head: Normocephalic and atraumatic.   Eyes:      Conjunctiva/sclera: Conjunctivae normal.   Cardiovascular:      Rate and Rhythm: Regular rhythm. Tachycardia present.      Heart sounds: No murmur heard.  Pulmonary:      Effort: Pulmonary effort is normal. No respiratory distress.      Breath sounds: Normal breath sounds.   Abdominal:      Palpations: Abdomen is soft.      Tenderness: There is no abdominal tenderness.    Musculoskeletal:         General: No swelling.      Cervical back: Neck supple.   Skin:     General: Skin is warm and dry.      Capillary Refill: Capillary refill takes less than 2 seconds.   Neurological:      Mental Status: She is alert.   Psychiatric:         Mood and Affect: Mood normal.           ED Course         Critical Care Time  Procedures

## 2025-01-21 NOTE — ED PROVIDER NOTES
Time reflects when diagnosis was documented in both MDM as applicable and the Disposition within this note       Time User Action Codes Description Comment    1/21/2025  3:38 PM ScottieAshwindy Villegas [J10.1] Influenza A     1/21/2025  3:39 PM Scottie Reny Villegas [R05.9] Cough     1/21/2025  3:39 PM Scottie Reny Villegas [R11.0] Nausea           ED Disposition       ED Disposition   Discharge    Condition   Stable    Date/Time   Tue Jan 21, 2025  3:38 PM    Comment   Soila Miguelchloe discharge to home/self care.                   Assessment & Plan       Medical Decision Making  Patient presents with less than 24 hours of sore throat, congestions and cough with some yellow/green sputum, brother sick with similar symptoms.  Favor flu/COVID URI versus strep.  Will check urine beta-hCG, flu/COVID rapid antigen, strep PCR.  Will provide symptom relief with Zofran, Tylenol, Mucinex, Tessalon Perles and 500cc NS bolus.   Patient was found to be flu a positive. COVID and strep negative, beta-hCG negative.  Patient declined oral Tylenol, gave Toradol for headache.  Patient is medically stable for discharge with Zofran and Tessalon Perles sent to pharmacy.    Amount and/or Complexity of Data Reviewed  Labs: ordered.    Risk  OTC drugs.  Prescription drug management.             Medications   guaiFENesin (MUCINEX) 12 hr tablet 600 mg (600 mg Oral Given 1/21/25 1432)   benzonatate (TESSALON PERLES) capsule 100 mg (100 mg Oral Given 1/21/25 1432)   ondansetron (ZOFRAN) injection 4 mg (4 mg Intravenous Given 1/21/25 1432)   sodium chloride 0.9 % bolus 500 mL (0 mL Intravenous Stopped 1/21/25 1445)   ketorolac (TORADOL) injection 15 mg (15 mg Intravenous Given 1/21/25 1527)       ED Risk Strat Scores                          SBIRT 22yo+      Flowsheet Row Most Recent Value   Initial Alcohol Screen: US AUDIT-C     1. How often do you have a drink containing alcohol? 0 Filed at: 01/21/2025 1445   2. How many drinks containing alcohol do you  "have on a typical day you are drinking?  0 Filed at: 01/21/2025 1447   3b. FEMALE Any Age, or MALE 65+: How often do you have 4 or more drinks on one occassion? 0 Filed at: 01/21/2025 1444   Audit-C Score 0 Filed at: 01/21/2025 1440   IMAN: How many times in the past year have you...    Used an illegal drug or used a prescription medication for non-medical reasons? Never Filed at: 01/21/2025 1447                            History of Present Illness       Chief Complaint   Patient presents with    Flu Symptoms     Sxs stated yesterday, HA \"I'm really sick\" nausea, dizziness chills. Unknown if she had a temp. No medications pta       Past Medical History:   Diagnosis Date    Anxiety     Asthma     Borderline personality disorder (HCC)     Eating disorder     Fibromyalgia     Ovarian cyst     PTSD (post-traumatic stress disorder)     Schizophrenia (HCC)       History reviewed. No pertinent surgical history.   Family History   Problem Relation Age of Onset    Asthma Mother     OCD Mother     Autism Son       Social History     Tobacco Use    Smoking status: Some Days     Types: Cigarettes    Smokeless tobacco: Never   Vaping Use    Vaping status: Former   Substance Use Topics    Alcohol use: No    Drug use: Yes     Types: Marijuana      E-Cigarette/Vaping    E-Cigarette Use Former User       E-Cigarette/Vaping Substances      I have reviewed and agree with the history as documented.     Patient presents with less than 24 hours of sore throat and cough.  She was febrile to 100.4 °F and tachycardic to 113 in the ED.  She states her brother has been sick with similar symptoms.  She also endorses a headache specifically retro-orbital, nausea without any episodes of emesis.  At times her cough is dry other she is productive of yellow/green mucus.        Flu Symptoms  Presenting symptoms: cough, fever and nausea    Presenting symptoms: no diarrhea and no vomiting    Associated symptoms: chills        Review of Systems "   Constitutional:  Positive for chills and fever.   HENT:  Positive for sinus pressure and sinus pain.    Respiratory:  Positive for cough.    Gastrointestinal:  Positive for nausea. Negative for constipation, diarrhea and vomiting.           Objective       ED Triage Vitals   Temperature Pulse Blood Pressure Respirations SpO2 Patient Position - Orthostatic VS   01/21/25 1315 01/21/25 1314 01/21/25 1314 01/21/25 1314 01/21/25 1314 01/21/25 1314   100.4 °F (38 °C) (!) 113 126/74 18 98 % Sitting      Temp Source Heart Rate Source BP Location FiO2 (%) Pain Score    01/21/25 1315 -- 01/21/25 1314 -- 01/21/25 1314    Oral  Right arm  10 - Worst Possible Pain      Vitals      Date and Time Temp Pulse SpO2 Resp BP Pain Score FACES Pain Rating User   01/21/25 1527 -- -- -- -- -- 10 - Worst Possible Pain -- DU   01/21/25 1315 100.4 °F (38 °C) -- -- -- -- -- -- DU   01/21/25 1314 -- 113 98 % 18 126/74 10 - Worst Possible Pain -- DU            Physical Exam  Constitutional:       General: She is not in acute distress.     Appearance: She is not ill-appearing or toxic-appearing.   Cardiovascular:      Rate and Rhythm: Regular rhythm. Tachycardia present.      Heart sounds: No murmur heard.     No gallop.   Pulmonary:      Effort: No respiratory distress.      Breath sounds: No wheezing or rales.   Abdominal:      General: There is no distension.      Tenderness: There is no abdominal tenderness. There is no guarding.   Neurological:      Mental Status: She is alert.         Results Reviewed       Procedure Component Value Units Date/Time    Strep A PCR [[id number]]  (Normal) Collected: 01/21/25 1430    Lab Status: Final result Specimen: Throat Updated: 01/21/25 1517     STREP A PCR Not Detected    FLU/COVID Rapid Antigen (30 min. TAT) - Preferred screening test in ED [[id number]]  (Abnormal) Collected: 01/21/25 1430    Lab Status: Final result Specimen: Nares from Nose Updated: 01/21/25 1508     SARS COV Rapid Antigen Negative      Influenza A Rapid Antigen Positive     Influenza B Rapid Antigen Negative    Narrative:      This test has been performed using the [employer] [facility] 2 FLU+SARS Antigen test under the Emergency Use Authorization (EUA). This test has been validated by the  and verified by the performing laboratory. The Linda uses lateral flow immunofluorescent sandwich assay to detect SARS-COV, Influenza A and Influenza B Antigen.     The [employer] [facility] 2 SARS Antigen test does not differentiate between SARS-CoV and SARS-CoV-2.     Negative results are presumptive and may be confirmed with a molecular assay, if necessary, for patient management. Negative results do not rule out SARS-CoV-2 or influenza infection and should not be used as the sole basis for treatment or patient management decisions. A negative test result may occur if the level of antigen in a sample is below the limit of detection of this test.     Positive results are indicative of the presence of viral antigens, but do not rule out bacterial infection or co-infection with other viruses.     All test results should be used as an adjunct to clinical observations and other information available to the provider.    FOR PEDIATRIC PATIENTS - copy/paste COVID Guidelines URL to browser: [email or web address]    POCT pregnancy, urine [[id number]]  (Normal) Collected: 25    Lab Status: Final result Updated: 25     EXT Preg Test, Ur Negative     Control Valid            No orders to display       Procedures    ED Medication and Procedure Management   Prior to Admission Medications   Prescriptions Last Dose Informant Patient Reported? Taking?   albuterol (PROVENTIL HFA,VENTOLIN HFA) 90 mcg/act inhaler   Yes No   Sig: Inhale 2 puffs every 6 (six) hours as needed for wheezing   budesonide (RINOCORT AQUA) 32 MCG/ACT nasal spray   Yes No   Si-2 sprays into each nostril   desogestrel-ethinyl  estradiol (APRI) 0.15-30 MG-MCG per tablet   No No   Sig: Take 1 tablet by mouth daily   famotidine (PEPCID) 20 mg tablet   No No   Sig: Take 1 tablet (20 mg total) by mouth 2 (two) times a day   ondansetron (ZOFRAN-ODT) 4 mg disintegrating tablet   No No   Sig: Take 1 tablet (4 mg total) by mouth every 6 (six) hours as needed for nausea or vomiting      Facility-Administered Medications: None     Current Discharge Medication List        START taking these medications    Details   benzonatate (TESSALON PERLES) 100 mg capsule Take 1 capsule (100 mg total) by mouth every 8 (eight) hours  Qty: 10 capsule, Refills: 0    Associated Diagnoses: Cough      ondansetron (ZOFRAN) 4 mg tablet Take 1 tablet (4 mg total) by mouth every 6 (six) hours  Qty: 10 tablet, Refills: 0    Associated Diagnoses: Nausea           CONTINUE these medications which have NOT CHANGED    Details   albuterol (PROVENTIL HFA,VENTOLIN HFA) 90 mcg/act inhaler Inhale 2 puffs every 6 (six) hours as needed for wheezing      budesonide (RINOCORT AQUA) 32 MCG/ACT nasal spray 1-2 sprays into each nostril      desogestrel-ethinyl estradiol (APRI) 0.15-30 MG-MCG per tablet Take 1 tablet by mouth daily  Qty: 84 tablet, Refills: 1    Associated Diagnoses: Chronic pelvic pain in female; Ovulatory pain; Encounter for initial prescription of contraceptive pills      famotidine (PEPCID) 20 mg tablet Take 1 tablet (20 mg total) by mouth 2 (two) times a day  Qty: 30 tablet, Refills: 0    Associated Diagnoses: Abdominal pain      ondansetron (ZOFRAN-ODT) 4 mg disintegrating tablet Take 1 tablet (4 mg total) by mouth every 6 (six) hours as needed for nausea or vomiting  Qty: 20 tablet, Refills: 0    Associated Diagnoses: Nausea and vomiting           No discharge procedures on file.  ED SEPSIS DOCUMENTATION   Time reflects when diagnosis was documented in both MDM as applicable and the Disposition within this note       Time User Action Codes Description Comment     1/21/2025  3:38 PM Reny Rubin [J10.1] Influenza A     1/21/2025  3:39 PM Reny Rubin [R05.9] Cough     1/21/2025  3:39 PM Reny Rubin [R11.0] Nausea                  Reny Rubin MD  01/21/25 154

## 2025-01-21 NOTE — Clinical Note
Soila Nolen was seen and treated in our emergency department on 1/21/2025.    No restrictions            Diagnosis:     Soila  may return to work on return date.    She may return on this date: 01/24/2025         If you have any questions or concerns, please don't hesitate to call.      Reny Rubin MD    ______________________________           _______________          _______________  Hospital Representative                              Date                                Time

## 2025-01-30 ENCOUNTER — OFFICE VISIT (OUTPATIENT)
Dept: OBGYN CLINIC | Facility: CLINIC | Age: 25
End: 2025-01-30
Payer: MEDICARE

## 2025-01-30 VITALS
WEIGHT: 116.8 LBS | DIASTOLIC BLOOD PRESSURE: 70 MMHG | HEIGHT: 60 IN | SYSTOLIC BLOOD PRESSURE: 106 MMHG | BODY MASS INDEX: 22.93 KG/M2

## 2025-01-30 DIAGNOSIS — G89.29 CHRONIC PELVIC PAIN IN FEMALE: Primary | ICD-10-CM

## 2025-01-30 DIAGNOSIS — Z30.41 SURVEILLANCE OF CONTRACEPTIVE PILL: ICD-10-CM

## 2025-01-30 DIAGNOSIS — R10.2 CHRONIC PELVIC PAIN IN FEMALE: Primary | ICD-10-CM

## 2025-01-30 PROCEDURE — 99213 OFFICE O/P EST LOW 20 MIN: CPT | Performed by: PHYSICIAN ASSISTANT

## 2025-01-30 RX ORDER — PANTOPRAZOLE SODIUM 20 MG/1
20 TABLET, DELAYED RELEASE ORAL DAILY
COMMUNITY
Start: 2024-12-10 | End: 2025-12-10

## 2025-01-30 RX ORDER — NORETHINDRONE ACETATE AND ETHINYL ESTRADIOL 1MG-20(21)
1 KIT ORAL DAILY
Qty: 84 TABLET | Refills: 1 | Status: SHIPPED | OUTPATIENT
Start: 2025-01-30

## 2025-01-30 RX ORDER — METHOCARBAMOL 500 MG/1
TABLET, FILM COATED ORAL 3 TIMES DAILY PRN
COMMUNITY
Start: 2024-11-12

## 2025-01-30 RX ORDER — HYDROXYZINE HYDROCHLORIDE 10 MG/5ML
4 SYRUP ORAL EVERY 6 HOURS PRN
COMMUNITY
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
## 2025-04-09 ENCOUNTER — HOSPITAL ENCOUNTER (EMERGENCY)
Facility: HOSPITAL | Age: 25
Discharge: HOME | End: 2025-04-09
Attending: EMERGENCY MEDICINE
Payer: MEDICARE

## 2025-04-09 VITALS
RESPIRATION RATE: 20 BRPM | TEMPERATURE: 97.5 F | DIASTOLIC BLOOD PRESSURE: 72 MMHG | SYSTOLIC BLOOD PRESSURE: 118 MMHG | HEART RATE: 66 BPM | OXYGEN SATURATION: 100 %

## 2025-04-09 DIAGNOSIS — R56.9 SEIZURE-LIKE ACTIVITY (HCC): Primary | ICD-10-CM

## 2025-04-09 LAB
ALBUMIN SERPL BCG-MCNC: 4.4 G/DL (ref 3.5–5)
ALP SERPL-CCNC: 59 U/L (ref 34–104)
ALT SERPL W P-5'-P-CCNC: 9 U/L (ref 7–52)
ANION GAP SERPL CALCULATED.3IONS-SCNC: 10 MMOL/L (ref 4–13)
AST SERPL W P-5'-P-CCNC: 22 U/L (ref 13–39)
BACTERIA UR QL AUTO: ABNORMAL /HPF
BASOPHILS # BLD AUTO: 0.03 THOUSANDS/ÂΜL (ref 0–0.1)
BASOPHILS NFR BLD AUTO: 1 % (ref 0–1)
BILIRUB SERPL-MCNC: 0.74 MG/DL (ref 0.2–1)
BILIRUB UR QL STRIP: NEGATIVE
BUN SERPL-MCNC: 10 MG/DL (ref 5–25)
CALCIUM SERPL-MCNC: 9.3 MG/DL (ref 8.4–10.2)
CHLORIDE SERPL-SCNC: 105 MMOL/L (ref 96–108)
CLARITY UR: CLEAR
CO2 SERPL-SCNC: 22 MMOL/L (ref 21–32)
COLOR UR: YELLOW
CREAT SERPL-MCNC: 0.58 MG/DL (ref 0.6–1.3)
EOSINOPHIL # BLD AUTO: 0.26 THOUSAND/ÂΜL (ref 0–0.61)
EOSINOPHIL NFR BLD AUTO: 4 % (ref 0–6)
ERYTHROCYTE [DISTWIDTH] IN BLOOD BY AUTOMATED COUNT: 12.5 % (ref 11.6–15.1)
EXT PREGNANCY TEST URINE: NEGATIVE
EXT. CONTROL: NORMAL
GFR SERPL CREATININE-BSD FRML MDRD: 129 ML/MIN/1.73SQ M
GLUCOSE SERPL-MCNC: 86 MG/DL (ref 65–140)
GLUCOSE UR STRIP-MCNC: NEGATIVE MG/DL
HCT VFR BLD AUTO: 35.9 % (ref 34.8–46.1)
HGB BLD-MCNC: 11.6 G/DL (ref 11.5–15.4)
HGB UR QL STRIP.AUTO: ABNORMAL
IMM GRANULOCYTES # BLD AUTO: 0.01 THOUSAND/UL (ref 0–0.2)
IMM GRANULOCYTES NFR BLD AUTO: 0 % (ref 0–2)
KETONES UR STRIP-MCNC: NEGATIVE MG/DL
LEUKOCYTE ESTERASE UR QL STRIP: NEGATIVE
LYMPHOCYTES # BLD AUTO: 2.52 THOUSANDS/ÂΜL (ref 0.6–4.47)
LYMPHOCYTES NFR BLD AUTO: 43 % (ref 14–44)
MAGNESIUM SERPL-MCNC: 1.9 MG/DL (ref 1.9–2.7)
MCH RBC QN AUTO: 30.1 PG (ref 26.8–34.3)
MCHC RBC AUTO-ENTMCNC: 32.3 G/DL (ref 31.4–37.4)
MCV RBC AUTO: 93 FL (ref 82–98)
MONOCYTES # BLD AUTO: 0.6 THOUSAND/ÂΜL (ref 0.17–1.22)
MONOCYTES NFR BLD AUTO: 10 % (ref 4–12)
NEUTROPHILS # BLD AUTO: 2.51 THOUSANDS/ÂΜL (ref 1.85–7.62)
NEUTS SEG NFR BLD AUTO: 42 % (ref 43–75)
NITRITE UR QL STRIP: NEGATIVE
NON-SQ EPI CELLS URNS QL MICRO: ABNORMAL /HPF
NRBC BLD AUTO-RTO: 0 /100 WBCS
PH UR STRIP.AUTO: 8 [PH]
PLATELET # BLD AUTO: 261 THOUSANDS/UL (ref 149–390)
PMV BLD AUTO: 10.1 FL (ref 8.9–12.7)
POTASSIUM SERPL-SCNC: 3.6 MMOL/L (ref 3.5–5.3)
PROT SERPL-MCNC: 7.2 G/DL (ref 6.4–8.4)
PROT UR STRIP-MCNC: NEGATIVE MG/DL
RBC # BLD AUTO: 3.85 MILLION/UL (ref 3.81–5.12)
RBC #/AREA URNS AUTO: ABNORMAL /HPF
SODIUM SERPL-SCNC: 137 MMOL/L (ref 135–147)
SP GR UR STRIP.AUTO: 1.01 (ref 1–1.03)
UROBILINOGEN UR QL STRIP.AUTO: 0.2 E.U./DL
WBC # BLD AUTO: 5.93 THOUSAND/UL (ref 4.31–10.16)
WBC #/AREA URNS AUTO: ABNORMAL /HPF

## 2025-04-09 PROCEDURE — 81025 URINE PREGNANCY TEST: CPT | Performed by: EMERGENCY MEDICINE

## 2025-04-09 PROCEDURE — 80053 COMPREHEN METABOLIC PANEL: CPT | Performed by: EMERGENCY MEDICINE

## 2025-04-09 PROCEDURE — 99285 EMERGENCY DEPT VISIT HI MDM: CPT | Performed by: EMERGENCY MEDICINE

## 2025-04-09 PROCEDURE — 36415 COLL VENOUS BLD VENIPUNCTURE: CPT | Performed by: EMERGENCY MEDICINE

## 2025-04-09 PROCEDURE — 81003 URINALYSIS AUTO W/O SCOPE: CPT | Performed by: EMERGENCY MEDICINE

## 2025-04-09 PROCEDURE — 99284 EMERGENCY DEPT VISIT MOD MDM: CPT

## 2025-04-09 PROCEDURE — 81001 URINALYSIS AUTO W/SCOPE: CPT | Performed by: EMERGENCY MEDICINE

## 2025-04-09 PROCEDURE — 93005 ELECTROCARDIOGRAM TRACING: CPT

## 2025-04-09 PROCEDURE — 85025 COMPLETE CBC W/AUTO DIFF WBC: CPT | Performed by: EMERGENCY MEDICINE

## 2025-04-09 PROCEDURE — 83735 ASSAY OF MAGNESIUM: CPT | Performed by: EMERGENCY MEDICINE

## 2025-04-09 NOTE — Clinical Note
Soila Nolen was seen and treated in our emergency department on 4/9/2025.                Diagnosis:     Soila  may return to work on return date.    She may return on this date: 04/11/2025         If you have any questions or concerns, please don't hesitate to call.      Monisha Maldonado, DO    ______________________________           _______________          _______________  Hospital Representative                              Date                                Time

## 2025-04-09 NOTE — ED PROVIDER NOTES
Time reflects when diagnosis was documented in both MDM as applicable and the Disposition within this note       Time User Action Codes Description Comment    4/9/2025  4:07 PM Monisha Maldonado Add [F44.5] Psychogenic nonepileptic seizure     4/9/2025  4:08 PM Monisha Maldonado Remove [F44.5] Psychogenic nonepileptic seizure     4/9/2025  4:08 PM Monisha Maldonado Add [R56.9] Seizure-like activity (HCC)           ED Disposition       ED Disposition   Discharge    Condition   Stable    Date/Time   Wed Apr 9, 2025  4:07 PM    Comment   Soila Nolen discharge to home/self care.                   Assessment & Plan       Medical Decision Making  Differential diagnosis includes but is not limited to nonepileptic seizure, seizure, anemia, dehydration, electrolyte abnormality, arrhythmia    Problems Addressed:  Seizure-like activity (HCC): acute illness or injury    Amount and/or Complexity of Data Reviewed  Labs: ordered. Decision-making details documented in ED Course.  ECG/medicine tests: ordered and independent interpretation performed. Decision-making details documented in ED Course.    Risk  OTC drugs.  Risk Details: Discussed with patient possibility of psychogenic nonepileptic seizures and that she should follow-up and discuss with neurology.        ED Course as of 04/09/25 1826   Wed Apr 09, 2025   1530 Review of EEG done in 2022 was within normal limits.  It sounds like she has nonepileptic seizures       Medications - No data to display    ED Risk Strat Scores                    No data recorded                            History of Present Illness       Chief Complaint   Patient presents with    Seizure - Prior Hx Of     Pt arrives via EMS from work after a witnessed seizure, pt was lowered to the ground. EMS reports 2-3 min seizure activity        Past Medical History:   Diagnosis Date    Anxiety     Asthma     Borderline personality disorder (HCC)     Eating disorder     Fibromyalgia     Ovarian cyst      PTSD (post-traumatic stress disorder)     Schizophrenia (HCC)       History reviewed. No pertinent surgical history.   Family History   Problem Relation Age of Onset    Asthma Mother     OCD Mother     Autism Son       Social History     Tobacco Use    Smoking status: Former     Types: Cigarettes    Smokeless tobacco: Never   Vaping Use    Vaping status: Former   Substance Use Topics    Alcohol use: No    Drug use: Not Currently     Types: Marijuana      E-Cigarette/Vaping    E-Cigarette Use Former User       E-Cigarette/Vaping Substances      I have reviewed and agree with the history as documented.     24-year-old female comes in for evaluation after seizure.  Patient has a known history of seizures.  States she has not felt right since Sunday states she often gets an aura of dizziness or chest pain prior to her seizures and that she has been dizzy since Sunday intermittently.  Today she was at work in a very hot room when she began to have some chest tightness and then had a seizure-described as being unconscious and shaking all over no loss of bowel or bladder function no tongue biting.  No known injury.  Patient states the seizure was witnessed and lasted approximately 2 to 3 minutes.  Patient does feel back to baseline.  Patient not currently on any antiseizure medications she states in the past when she took them she had too many side effects.      History provided by:  Patient, medical records and relative   used: No    Seizure - Prior Hx Of  Seizure activity on arrival: no    Seizure type:  Unable to specify  Preceding symptoms: aura, dizziness and panic    Initial focality:  Unable to specify  Episode characteristics: unresponsiveness  Eye deviation: 3.  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   HENT:  Negative for ear pain and sore throat.    Eyes:  Negative for pain and visual disturbance.   Respiratory:  Negative for cough and shortness of breath.    Cardiovascular:  Negative for chest pain and palpitations.   Gastrointestinal:  Negative for abdominal pain and vomiting.   Genitourinary:  Negative for dysuria and hematuria.   Musculoskeletal:  Negative for arthralgias and back pain.   Skin:  Negative for color change and rash.   Neurological:  Positive for seizures. Negative for syncope.   Psychiatric/Behavioral:  The patient is nervous/anxious.    All other systems reviewed and are negative.          Objective       ED Triage Vitals   Temperature Pulse Blood Pressure Respirations SpO2 Patient Position - Orthostatic VS   04/09/25 1458 04/09/25 1454 04/09/25 1454 04/09/25 1454 04/09/25 1454 04/09/25 1454   97.5 °F (36.4 °C) 74 131/74 18 99 % Lying      Temp Source Heart Rate Source BP Location FiO2 (%) Pain Score    04/09/25 1458 04/09/25 1454 04/09/25 1454 -- 04/09/25 1553    Oral Monitor Right arm  No Pain      Vitals      Date and Time Temp Pulse SpO2 Resp BP Pain Score FACES Pain Rating User   04/09/25 1553 -- 66 100 % 20 118/72 No Pain -- KH   04/09/25 1458 97.5 °F (36.4 °C) -- -- -- -- -- -- RN   04/09/25 1454 -- 74 99 % 18 131/74 -- -- DS            Physical Exam  Vitals and nursing note reviewed.   Constitutional:       General: She is not in acute distress.     Appearance: She is well-developed.   HENT:      Head: Normocephalic and atraumatic.   Eyes:      Conjunctiva/sclera: Conjunctivae normal.   Cardiovascular:      Rate and Rhythm: Normal rate and regular rhythm.      Heart sounds: No murmur heard.  Pulmonary:      Effort: Pulmonary effort is normal. No respiratory distress.      Breath sounds: Normal breath sounds.    Abdominal:      Palpations: Abdomen is soft.      Tenderness: There is no abdominal tenderness.   Musculoskeletal:         General: No swelling.      Cervical back: Neck supple.   Skin:     General: Skin is warm and dry.      Capillary Refill: Capillary refill takes less than 2 seconds.   Neurological:      Mental Status: She is alert.   Psychiatric:         Mood and Affect: Mood normal.         Results Reviewed       Procedure Component Value Units Date/Time    Urine Microscopic [[id number]]  (Abnormal) Collected: 04/09/25 1551    Lab Status: Final result Specimen: Urine, Clean Catch Updated: 04/09/25 1606     RBC, UA 0-1 /hpf      WBC, UA 0-1 /hpf      Epithelial Cells Moderate /hpf      Bacteria, UA Moderate /hpf     UA w Reflex to Microscopic w Reflex to Culture [[id number]]  (Abnormal) Collected: 04/09/25 1551    Lab Status: Final result Specimen: Urine, Clean Catch Updated: 04/09/25 1557     Color, UA Yellow     Clarity, UA Clear     Specific Gravity, UA 1.015     pH, UA 8.0     Leukocytes, UA Negative     Nitrite, UA Negative     Protein, UA Negative mg/dl      Glucose, UA Negative mg/dl      Ketones, UA Negative mg/dl      Urobilinogen, UA 0.2 E.U./dl      Bilirubin, UA Negative     Occult Blood, UA 3+    POCT pregnancy, urine [[id number]]  (Normal) Collected: 04/09/25 1553    Lab Status: Final result Updated: 04/09/25 1553     EXT Preg Test, Ur Negative     Control Valid    Comprehensive metabolic panel [[id number]]  (Abnormal) Collected: 04/09/25 1505    Lab Status: Final result Specimen: Blood from Hand, Left Updated: 04/09/25 1529     Sodium 137 mmol/L      Potassium 3.6 mmol/L      Chloride 105 mmol/L      CO2 22 mmol/L      ANION GAP 10 mmol/L      BUN 10 mg/dL      Creatinine 0.58 mg/dL      Glucose 86 mg/dL      Calcium 9.3 mg/dL      AST 22 U/L      ALT 9 U/L      Alkaline Phosphatase 59 U/L      Total Protein 7.2 g/dL      Albumin 4.4 g/dL      Total Bilirubin 0.74 mg/dL      eGFR 129 ml/min/1.73sq m      Narrative:      [employer] guidelines for Chronic Kidney Disease (CKD):     Stage 1 with normal or high GFR (GFR > 90 mL/min/1.73 square meters)    Stage 2 Mild CKD (GFR = 60-89 mL/min/1.73 square meters)    Stage 3A Moderate CKD (GFR = 45-59 mL/min/1.73 square meters)    Stage 3B Moderate CKD (GFR = 30-44 mL/min/1.73 square meters)    Stage 4 Severe CKD (GFR = 15-29 mL/min/1.73 square meters)    Stage 5 End Stage CKD (GFR <15 mL/min/1.73 square meters)  Note: GFR calculation is accurate only with a steady state creatinine    Magnesium [[id number]]  (Normal) Collected: 04/09/25 1505    Lab Status: Final result Specimen: Blood from Hand, Left Updated: 04/09/25 1529     Magnesium 1.9 mg/dL     CBC and differential [[id number]]  (Abnormal) Collected: 04/09/25 1505    Lab Status: Final result Specimen: Blood from Hand, Left Updated: 04/09/25 1513     WBC 5.93 Thousand/uL      RBC 3.85 Million/uL      Hemoglobin 11.6 g/dL      Hematocrit 35.9 %      MCV 93 fL      MCH 30.1 pg      MCHC 32.3 g/dL      RDW 12.5 %      MPV 10.1 fL      Platelets 261 Thousands/uL      nRBC 0 /100 WBCs      Segmented % 42 %      Immature Grans % 0 %      Lymphocytes % 43 %      Monocytes % 10 %      Eosinophils Relative 4 %      Basophils Relative 1 %      Absolute Neutrophils 2.51 Thousands/µL      Absolute Immature Grans 0.01 Thousand/uL      Absolute Lymphocytes 2.52 Thousands/µL      Absolute Monocytes 0.60 Thousand/µL      Eosinophils Absolute 0.26 Thousand/µL      Basophils Absolute 0.03 Thousands/µL             No orders to display       ECG 12 Lead Documentation Only    Date/Time: 4/9/2025 2:56 PM    Performed by: Monisha Maldonado DO  Authorized by: Monisha Maldonado DO    Rate:     ECG rate:  74  Rhythm:     Rhythm: sinus rhythm    Ectopy:     Ectopy: none        ED Medication and Procedure Management   Prior to Admission Medications   Prescriptions Last Dose Informant Patient Reported? Taking?    albuterol (PROVENTIL HFA,VENTOLIN HFA) 90 mcg/act inhaler   Yes No   Sig: Inhale 2 puffs every 6 (six) hours as needed for wheezing   benzonatate (TESSALON PERLES) 100 mg capsule   No No   Sig: Take 1 capsule (100 mg total) by mouth every 8 (eight) hours   chlorpheniramine (CHLOR-TRIMETON) 4 MG tablet   Yes No   Sig: Take 4 mg by mouth every 6 (six) hours as needed   norethindrone-ethinyl estradiol (Junel FE 1/20) 1-20 MG-MCG per tablet   No No   Sig: Take 1 tablet by mouth daily   pantoprazole (PROTONIX) 20 mg tablet   Yes No   Sig: Take 20 mg by mouth daily      Facility-Administered Medications: None     Discharge Medication List as of 4/9/2025  4:08 PM        CONTINUE these medications which have NOT CHANGED    Details   albuterol (PROVENTIL HFA,VENTOLIN HFA) 90 mcg/act inhaler Inhale 2 puffs every 6 (six) hours as needed for wheezing, Until Discontinued, Historical Med      benzonatate (TESSALON PERLES) 100 mg capsule Take 1 capsule (100 mg total) by mouth every 8 (eight) hours, Starting Tue 1/21/2025, Normal      chlorpheniramine (CHLOR-TRIMETON) 4 MG tablet Take 4 mg by mouth every 6 (six) hours as needed, Historical Med      norethindrone-ethinyl estradiol (Junel FE 1/20) 1-20 MG-MCG per tablet Take 1 tablet by mouth daily, Starting Thu 1/30/2025, Normal      pantoprazole (PROTONIX) 20 mg tablet Take 20 mg by mouth daily, Starting Tue 12/10/2024, Until Wed 12/10/2025, Historical Med           No discharge procedures on file.  ED SEPSIS DOCUMENTATION   Time reflects when diagnosis was documented in both MDM as applicable and the Disposition within this note       Time User Action Codes Description Comment    4/9/2025  4:07 PM Monisha Maldonado Add [F44.5] Psychogenic nonepileptic seizure     4/9/2025  4:08 PM Monisha Maldonado Remove [F44.5] Psychogenic nonepileptic seizure     4/9/2025  4:08 PM Monisha Maldonado Add [R56.9] Seizure-like activity (HCC)                  Monisha Maldonado DO  04/09/25  1826

## 2025-04-10 LAB
ATRIAL RATE: 74 BPM
P AXIS: 61 DEGREES
PR INTERVAL: 146 MS
QRS AXIS: 88 DEGREES
QRSD INTERVAL: 90 MS
QT INTERVAL: 394 MS
QTC INTERVAL: 437 MS
T WAVE AXIS: 51 DEGREES
VENTRICULAR RATE: 74 BPM

## 2025-04-10 PROCEDURE — 93010 ELECTROCARDIOGRAM REPORT: CPT | Performed by: INTERNAL MEDICINE

## 2025-06-06 ENCOUNTER — TELEPHONE (OUTPATIENT)
Dept: NEUROLOGY | Facility: CLINIC | Age: 25
End: 2025-06-06